# Patient Record
Sex: FEMALE | Race: WHITE | NOT HISPANIC OR LATINO | Employment: OTHER | URBAN - METROPOLITAN AREA
[De-identification: names, ages, dates, MRNs, and addresses within clinical notes are randomized per-mention and may not be internally consistent; named-entity substitution may affect disease eponyms.]

---

## 2018-02-01 ENCOUNTER — TELEPHONE (OUTPATIENT)
Dept: PULMONOLOGY | Facility: CLINIC | Age: 78
End: 2018-02-01

## 2018-02-01 ENCOUNTER — OFFICE VISIT (OUTPATIENT)
Dept: PULMONOLOGY | Facility: CLINIC | Age: 78
End: 2018-02-01
Payer: MEDICARE

## 2018-02-01 VITALS
WEIGHT: 129.19 LBS | HEART RATE: 75 BPM | RESPIRATION RATE: 16 BRPM | HEIGHT: 62 IN | BODY MASS INDEX: 23.77 KG/M2 | OXYGEN SATURATION: 97 % | DIASTOLIC BLOOD PRESSURE: 70 MMHG | SYSTOLIC BLOOD PRESSURE: 146 MMHG

## 2018-02-01 DIAGNOSIS — J47.9 BRONCHIECTASIS WITHOUT COMPLICATION: ICD-10-CM

## 2018-02-01 DIAGNOSIS — A31.0 MAIC (MYCOBACTERIUM AVIUM-INTRACELLULARE COMPLEX): Primary | ICD-10-CM

## 2018-02-01 DIAGNOSIS — J44.9 CHRONIC OBSTRUCTIVE PULMONARY DISEASE, UNSPECIFIED COPD TYPE: ICD-10-CM

## 2018-02-01 PROBLEM — M81.0 OSTEOPOROSIS, POST-MENOPAUSAL: Status: ACTIVE | Noted: 2017-10-23

## 2018-02-01 PROCEDURE — 94640 AIRWAY INHALATION TREATMENT: CPT | Mod: PBBFAC,PO

## 2018-02-01 PROCEDURE — 99999 PR PBB SHADOW E&M-NEW PATIENT-LVL IV: CPT | Mod: PBBFAC,,, | Performed by: INTERNAL MEDICINE

## 2018-02-01 PROCEDURE — 1159F MED LIST DOCD IN RCRD: CPT | Mod: ,,, | Performed by: INTERNAL MEDICINE

## 2018-02-01 PROCEDURE — 99204 OFFICE O/P NEW MOD 45 MIN: CPT | Mod: PBBFAC,PO,25 | Performed by: INTERNAL MEDICINE

## 2018-02-01 PROCEDURE — 99205 OFFICE O/P NEW HI 60 MIN: CPT | Mod: S$PBB,,, | Performed by: INTERNAL MEDICINE

## 2018-02-01 RX ORDER — ROSUVASTATIN CALCIUM 5 MG/1
5 TABLET, COATED ORAL NIGHTLY
COMMUNITY
Start: 2017-10-23

## 2018-02-01 RX ORDER — RIFAMPIN 300 MG/1
1 CAPSULE ORAL
COMMUNITY
Start: 2017-12-14 | End: 2018-02-01 | Stop reason: SDUPTHER

## 2018-02-01 RX ORDER — FLUOCINONIDE 0.5 MG/G
CREAM TOPICAL
COMMUNITY
Start: 2017-08-09 | End: 2021-08-11

## 2018-02-01 RX ORDER — LEVOFLOXACIN 500 MG/1
1 TABLET, FILM COATED ORAL DAILY
COMMUNITY
Start: 2017-12-01 | End: 2018-02-01 | Stop reason: ALTCHOICE

## 2018-02-01 RX ORDER — MONTELUKAST SODIUM 10 MG/1
10 TABLET ORAL DAILY
COMMUNITY
Start: 2017-10-23 | End: 2018-03-01 | Stop reason: SDUPTHER

## 2018-02-01 RX ORDER — RIFAMPIN 300 MG/1
300 CAPSULE ORAL
Qty: 36 CAPSULE | Refills: 4 | Status: SHIPPED | OUTPATIENT
Start: 2018-02-02 | End: 2018-04-25 | Stop reason: SINTOL

## 2018-02-01 RX ORDER — ALENDRONATE SODIUM 70 MG/1
70 TABLET ORAL DAILY
COMMUNITY
Start: 2017-10-23 | End: 2018-04-25 | Stop reason: SDDI

## 2018-02-01 RX ORDER — AMITRIPTYLINE HYDROCHLORIDE 10 MG/1
10 TABLET, FILM COATED ORAL NIGHTLY
COMMUNITY
Start: 2017-10-23

## 2018-02-01 RX ORDER — AZITHROMYCIN 500 MG/1
1 TABLET, FILM COATED ORAL
COMMUNITY
Start: 2017-12-14 | End: 2018-02-01 | Stop reason: SDUPTHER

## 2018-02-01 RX ORDER — EPINEPHRINE 0.22MG
100 AEROSOL WITH ADAPTER (ML) INHALATION DAILY
COMMUNITY

## 2018-02-01 RX ORDER — ETHAMBUTOL HYDROCHLORIDE 400 MG/1
1200 TABLET, FILM COATED ORAL
COMMUNITY
Start: 2017-12-15 | End: 2018-02-01 | Stop reason: SDUPTHER

## 2018-02-01 RX ORDER — LOSARTAN POTASSIUM 100 MG/1
100 TABLET ORAL NIGHTLY
COMMUNITY
Start: 2017-10-23 | End: 2019-04-17

## 2018-02-01 RX ORDER — AMLODIPINE BESYLATE 5 MG/1
5 TABLET ORAL EVERY MORNING
Status: ON HOLD | COMMUNITY
Start: 2017-11-27 | End: 2018-10-18 | Stop reason: HOSPADM

## 2018-02-01 RX ORDER — ALBUTEROL SULFATE 0.83 MG/ML
2.5 SOLUTION RESPIRATORY (INHALATION)
Qty: 360 ML | Refills: 12 | Status: SHIPPED | OUTPATIENT
Start: 2018-02-01 | End: 2018-05-23 | Stop reason: SINTOL

## 2018-02-01 RX ORDER — ASPIRIN 325 MG
325 TABLET, DELAYED RELEASE (ENTERIC COATED) ORAL NIGHTLY
COMMUNITY
End: 2022-03-30

## 2018-02-01 RX ORDER — ETHAMBUTOL HYDROCHLORIDE 400 MG/1
1200 TABLET, FILM COATED ORAL
Qty: 108 TABLET | Refills: 4 | Status: SHIPPED | OUTPATIENT
Start: 2018-02-02 | End: 2018-04-25 | Stop reason: SINTOL

## 2018-02-01 RX ORDER — ETODOLAC 400 MG/1
1 TABLET, FILM COATED ORAL DAILY
Status: ON HOLD | COMMUNITY
Start: 2018-01-01 | End: 2018-10-18 | Stop reason: HOSPADM

## 2018-02-01 RX ORDER — ESOMEPRAZOLE MAGNESIUM 40 MG/1
40 CAPSULE, DELAYED RELEASE ORAL
COMMUNITY
Start: 2017-10-23

## 2018-02-01 RX ORDER — AZITHROMYCIN 500 MG/1
500 TABLET, FILM COATED ORAL
Qty: 36 TABLET | Refills: 4 | Status: SHIPPED | OUTPATIENT
Start: 2018-02-02 | End: 2018-03-01

## 2018-02-01 RX ORDER — ALBUTEROL SULFATE 0.83 MG/ML
2.5 SOLUTION RESPIRATORY (INHALATION)
Status: COMPLETED | OUTPATIENT
Start: 2018-02-01 | End: 2018-02-01

## 2018-02-01 RX ADMIN — ALBUTEROL SULFATE 2.5 MG: 2.5 SOLUTION INTRABRONCHIAL at 12:02

## 2018-02-01 NOTE — PROGRESS NOTES
Subjective:       Patient ID: Daniel Jones is a 77 y.o. female.    Chief Complaint: She       Shortness of Breath (atypical bacterial infection)    HPI   Symptoms began in 2017   Aypical TB  Weight loss  Abnromal Chest X Ray  Bronchoscopy revealed Atypical TB  Abnormal CT scan    Chronic Obstructive Pulmonary Disease / Bronchiectesis  Bronchiectasis  She presents for evaluation and treatment of bronchiectasis. The patient was diagnosed with bronchiectasis on 8/1/2017 on the basis of a CT scan. The bronchiectasis was localized to the lingula and localized to the right middle lobe. The patient has a history of atypical mycobacterium infection, first diagnosed 8/2018. Prior testing has included pulmonary function testing and sputum studies for fungus and mycobacteria which revealed evidence of bronchiectesis. The patient is having constitutional symptoms, including weight loss. There evidence of MAIC history of pneumonia, and the patient has not been hospitalized for this condition before. She has a history of 5 pack years. The patient has no exercise limitations. Prior treatments have included antibiotics for MAIC, which have provided no relief of symptoms.     COPD  She presents for evaluation and treatment of COPD. The patient is not currently have symptoms / an exacerbation. The patient has COPD for approximately 1 years. Symptoms in previous episodes have included cough, fatigue and weight loss, and typically last 1 months. Previous episodes have been exacerbated by strenuous activity. Current treatment includes none, which generally provides no relief of symptoms.   She uses 1 pillows at night. Patient currently is not on home oxygen therapy.. The patient is having constitutional symptoms, including weight loss. The patient has not been hospitalized for this condition before. She has a history of 5 pack years. The patient has no exercise limitations.      Past Medical History:   Diagnosis Date    Arthritis      Hypertension     Thyroid disease      Past Surgical History:   Procedure Laterality Date    tonsilectomy N/A      Social History     Social History    Marital status:      Spouse name: N/A    Number of children: N/A    Years of education: N/A     Occupational History    Not on file.     Social History Main Topics    Smoking status: Former Smoker     Types: Cigarettes     Quit date: 1970    Smokeless tobacco: Never Used    Alcohol use No    Drug use: No    Sexual activity: No     Other Topics Concern    Not on file     Social History Narrative    No narrative on file     Review of Systems   Constitutional: Positive for fatigue. Negative for fever.   HENT: Positive for postnasal drip, rhinorrhea and congestion.    Eyes: Negative for redness and itching.   Respiratory: Positive for cough, sputum production, shortness of breath, dyspnea on extertion, use of rescue inhaler and Paroxysmal Nocturnal Dyspnea.    Cardiovascular: Negative for chest pain, palpitations and leg swelling.   Genitourinary: Negative for difficulty urinating and hematuria.   Endocrine: Negative for cold intolerance and heat intolerance.    Skin: Negative for rash.   Gastrointestinal: Negative for nausea and abdominal pain.   Neurological: Negative for dizziness, syncope, weakness and light-headedness.   Hematological: Negative for adenopathy. Does not bruise/bleed easily.   Psychiatric/Behavioral: Negative for sleep disturbance. The patient is not nervous/anxious.        Objective:      Physical Exam   Constitutional: She is oriented to person, place, and time.   Chronically ill appearing       HENT:   Head: Normocephalic and atraumatic.   Mouth/Throat: Oropharyngeal exudate present.   Eyes: Conjunctivae are normal. Pupils are equal, round, and reactive to light.   Neck: Neck supple. No JVD present. No tracheal deviation present. No thyromegaly present.   Cardiovascular: Normal rate, regular rhythm and normal heart sounds.     Pulmonary/Chest: Effort normal. No respiratory distress. She has decreased breath sounds. She has wheezes in the right lower field and the left lower field. She has no rhonchi. She has no rales. She exhibits no tenderness.   Abdominal: Soft. Bowel sounds are normal.   Musculoskeletal: Normal range of motion. She exhibits no edema.   Lymphadenopathy:     She has no cervical adenopathy.   Neurological: She is alert and oriented to person, place, and time.   Skin: Skin is warm and dry.   Nursing note and vitals reviewed.    Personal Diagnostic Review  Chest x-ray: bronchiectasis    CT Chest without Contrast[facility]9/2017  [facility]  Result Impression   Scattered reticular nodular opacities in the lungs, centered at the right  middle lobe and lingular segment left upper lobe with additional nodules in the  lingular segment as well as cylindrical bronchiectasis at both of these  locations.  The appearance and distribution is typical of atypical  mycobacterial infection.  Other chronic findings as described above.  All CT scans at this facility use dose modulation, iterative reconstruction,  and/or weight base dosing when appropriate to reduce radiation dose to as low  as reasonably achievable.   Result Narrative   EXAM:   CT  CHEST WITHOUT CONTRAST  CLINICAL HISTORY:  Other nonspecific abnormal finding of lung field.  Abnormal  chest x-ray.  COMPARISON STUDIES: Chest x-ray 07/27/2017  TECHNIQUE:  CT scan performed of the chest without IV contrast.  FINDINGS:  Heart: Heart size normal.  Small amount of scattered coronary arterial  calcifications.  Mediastinum: Small subcentimeter lymph nodes without significant enlargement.  Vasculature: Moderate aortic atherosclerosis.  Lungs: Some patchy scattered reticular nodular type opacities are present  throughout the lungs though greatest in the right middle lobe and lingular  segment in the left upper lobe.  There also are a couple large areas  of  nodularity in the lingular segment of the left upper lobe each measuring  roughly 1 cm in size.  There is some cylindrical bronchiectasis at the right  middle lobe and the very inferior lingular segment with a small amount of  scarring or atelectasis.  There are no pleural effusions.  There is a small  fat-containing Bochdalek hernia on the left.  Visualized upper abdomen: Large amount of calcified plaque aorta.  Bones: Low-grade degenerative changes in the spine.  Chest wall: Unremarkable.  Dictated and Electronically Signed By: Farhad Gannon MD on August 9, 2017     AFB culture and stain8/21/2017  [facility]  Component Name Value Ref Range   Culture AFB Mycobacterium avium complex (A)   Comment:  See Separate Reference Lab Report for additional results - [id number]  Identification and susceptibilities performed by:  [facility]  [address] [place]  [address]  This is an updated result. Previous organism was POSITIVE for AFB (isolated from liquid media) on 9/8/2017 at 1042 CDT.  This is an updated result. Previous organism was Mycobacterium abscessus group on 9/14/2017 at 0835 CDT.     AFB Stain No acid fast bacilli seen on fluorescent stain.     AFB Stain Rapid growing AFB such as M. chelonae, M. abscessus, and M. fortuitum may not fluoresce.    Comprehensive metabolic panel11/13/2017  [facility]  Component Name Value Ref Range   Glucose 85 <100 mg/dL   BUN 15 6 - 22 mg/dL   Creatinine, Ser 0.91 0.50 - 1.20 mg/dL   Total Bilirubin 0.3 0.2 - 1.1 mg/dL   Alkaline Phosphatase 78 42 - 121 IU/L    AST 14 10 - 42 IU/L    ALT 10 10 - 60 IU/L    Calcium 9.3 8.4 - 10.5 mg/dL   Sodium 137 134 - 146 meq/L   Potassium 5.0 3.6 - 5.3 meq/L   Chloride 101.0 98.0 - 111.0 meq/dL   CO2 Total 30 20 - 34 meq/L   Total Protein 6.7 6.1 - 8.5 g/dL   Albumin, Ser 4.2 3.2 - 5.5 g/dL   eGFR Non- 60     eGFR   73     Note: INTERPRETATION:   THE eGFR VALUE SHOULD NOT BE USED IN PATIENTS WITH ACUTE RENAL FAILURE.                           STAGE ONE/TWO    >60 eGFR                           STAGE THREE    30-59 eGFR                           STAGE FOUR     15-29 eGFR                             No flowsheet data found.      Assessment:       1. MAIC (mycobacterium avium-intracellulare complex)    2. Bronchiectasis without complication    3. Chronic obstructive pulmonary disease, unspecified COPD type        Outpatient Encounter Prescriptions as of 2/1/2018   Medication Sig Dispense Refill    amitriptyline (ELAVIL) 10 MG tablet Take 10 mg by mouth nightly.      amLODIPine (NORVASC) 5 MG tablet Take 1 tablet by mouth once daily.      aspirin (ECOTRIN) 325 MG EC tablet Take 325 mg by mouth nightly.      azithromycin (ZITHROMAX) 500 MG tablet Take 1 tablet (500 mg total) by mouth every Mon, Wed, Fri. 36 tablet 4    coenzyme Q10 (CO Q-10) 100 mg capsule Take 100 mg by mouth once daily.      esomeprazole (NEXIUM) 40 MG capsule Take 40 mg by mouth before breakfast.      ethambutol (MYAMBUTOL) 400 MG Tab Take 3 tablets (1,200 mg total) by mouth 3 (three) times a week. 108 tablet 4    etodolac (LODINE) 400 MG tablet Take 1 tablet by mouth once daily.      flu vac-2017 [id number],PF, (FLUAD 6124-2270, 65 YR UP,,PF,) 45 mcg (15 mcg x 3)/0.5 mL Syrg 0.5 mLs.      fluocinonide 0.05% (LIDEX) 0.05 % cream LEODAN 1 APPLICATION ON THE SKIN BID      losartan (COZAAR) 100 MG tablet Take 100 mg by mouth once daily.      montelukast (SINGULAIR) 10 mg tablet Take 10 mg by mouth once daily.      rifAMpin (RIFADIN) 300 MG capsule Take 1 capsule (300 mg total) by mouth 3 (three) times a week. 36 capsule 4    rosuvastatin (CRESTOR) 5 MG tablet Take 5 mg by mouth once daily.      [DISCONTINUED] azithromycin (ZITHROMAX) 500 MG tablet Take 1 tablet by mouth 3 (three) times a week.      [DISCONTINUED] ethambutol (MYAMBUTOL) 400 MG Tab  "Take 1,200 mg by mouth 3 (three) times a week.      [DISCONTINUED] levoFLOXacin (LEVAQUIN) 500 MG tablet Take 1 tablet by mouth once daily.      [DISCONTINUED] rifAMpin (RIFADIN) 300 MG capsule Take 1 capsule by mouth 3 (three) times a week.      albuterol (PROVENTIL) 2.5 mg /3 mL (0.083 %) nebulizer solution Take 3 mLs (2.5 mg total) by nebulization every 6 (six) hours while awake. 360 mL 12    alendronate (FOSAMAX) 70 MG tablet Take 70 mg by mouth once daily.      [] albuterol nebulizer solution 2.5 mg        No facility-administered encounter medications on file as of 2018.      Orders Placed This Encounter   Procedures    NEBULIZER FOR HOME USE     [facility] for CPAP/Oxygen/Nebulizer supplies.  Customer Service: [phone]  Call: [phone]  Fax: [phone]  Billing Inquiries: [phone] or [phone]       Order Specific Question:   Height:     Answer:   5' 2" (1.575 m)     Order Specific Question:   Weight:     Answer:   58.6 kg (129 lb 3 oz)     Order Specific Question:   Length of need (1-99 months):     Answer:   99     Order Specific Question:   Vendor:     Answer:   Other (use comments)     Order Specific Question:   Expected Date of Delivery:     Answer:   2018    NEBULIZER KIT (SUPPLIES) FOR HOME USE     Order Specific Question:   Height:     Answer:   5' 2" (1.575 m)     Order Specific Question:   Weight:     Answer:   58.6 kg (129 lb 3 oz)     Order Specific Question:   Length of need (1-99 months):     Answer:   99     Order Specific Question:   Mask or Mouthpiece?     Answer:   Mouthpiece     Order Specific Question:   Vendor:     Answer:   Other (use comments)     Order Specific Question:   Expected Date of Delivery:     Answer:   2018    X-Ray Chest PA And Lateral     Standing Status:   Future     Standing Expiration Date:   2019     Order Specific Question:   May the Radiologist modify the order per protocol to meet the clinical needs of the patient? "     Answer:   Yes    Comprehensive metabolic panel     Standing Status:   Future     Number of Occurrences:   1     Standing Expiration Date:   4/2/2019    Alpha 1 Antitrypsin Defiiciency Profile     Alpha 1 antitrypsin deficientcy DX: 273.4     Standing Status:   Future     Number of Occurrences:   1     Standing Expiration Date:   4/2/2019    CBC auto differential     Standing Status:   Future     Number of Occurrences:   1     Standing Expiration Date:   4/2/2019    Spirometry with/without bronchodilator     Standing Status:   Future     Standing Expiration Date:   2/1/2019     Plan:       Requested Prescriptions     Signed Prescriptions Disp Refills    rifAMpin (RIFADIN) 300 MG capsule 36 capsule 4     Sig: Take 1 capsule (300 mg total) by mouth 3 (three) times a week.    azithromycin (ZITHROMAX) 500 MG tablet 36 tablet 4     Sig: Take 1 tablet (500 mg total) by mouth every Mon, Wed, Fri.    ethambutol (MYAMBUTOL) 400 MG Tab 108 tablet 4     Sig: Take 3 tablets (1,200 mg total) by mouth 3 (three) times a week.    albuterol (PROVENTIL) 2.5 mg /3 mL (0.083 %) nebulizer solution 360 mL 12     Sig: Take 3 mLs (2.5 mg total) by nebulization every 6 (six) hours while awake.     MAIC (mycobacterium avium-intracellulare complex)  -     Comprehensive metabolic panel; Future; Expected date: 02/01/2018  -     Alpha 1 Antitrypsin Defiiciency Profile; Future; Expected date: 02/01/2018  -     CBC auto differential; Future; Expected date: 02/01/2018  -     Spirometry with/without bronchodilator; Future; Expected date: 08/04/2018  -     rifAMpin (RIFADIN) 300 MG capsule; Take 1 capsule (300 mg total) by mouth 3 (three) times a week.  Dispense: 36 capsule; Refill: 4  -     azithromycin (ZITHROMAX) 500 MG tablet; Take 1 tablet (500 mg total) by mouth every Mon, Wed, Fri.  Dispense: 36 tablet; Refill: 4  -     ethambutol (MYAMBUTOL) 400 MG Tab; Take 3 tablets (1,200 mg total) by mouth 3 (three) times a week.  Dispense: 108  tablet; Refill: 4  -     X-Ray Chest PA And Lateral; Future; Expected date: 02/01/2018    Bronchiectasis without complication  -     NEBULIZER FOR HOME USE  -     NEBULIZER KIT (SUPPLIES) FOR HOME USE  -     albuterol (PROVENTIL) 2.5 mg /3 mL (0.083 %) nebulizer solution; Take 3 mLs (2.5 mg total) by nebulization every 6 (six) hours while awake.  Dispense: 360 mL; Refill: 12  -     X-Ray Chest PA And Lateral; Future; Expected date: 02/01/2018    Chronic obstructive pulmonary disease, unspecified COPD type  -     NEBULIZER FOR HOME USE  -     NEBULIZER KIT (SUPPLIES) FOR HOME USE  -     albuterol (PROVENTIL) 2.5 mg /3 mL (0.083 %) nebulizer solution; Take 3 mLs (2.5 mg total) by nebulization every 6 (six) hours while awake.  Dispense: 360 mL; Refill: 12  -     albuterol nebulizer solution 2.5 mg; Take 3 mLs (2.5 mg total) by nebulization one time.  -     X-Ray Chest PA And Lateral; Future; Expected date: 02/01/2018           Follow-up in about 4 weeks (around 3/1/2018) for review of progress, CXR and Sebastian on return.    MEDICAL DECISION MAKING: Moderate to high complexity.  Overall, the multiple problems listed are of moderate to high severity that may impact quality of life and activities of daily living. Side effects of medications, treatment plan as well as options and alternatives reviewed and discussed with patient. There was counseling of patient concerning these issues.    Total time spent in face to face counseling and coordination of care - 60  minutes over 50% of time was used in discussion of prognosis, risks, benefits of treatment, instructions and compliance with regimen . Discussion with other physicians or health care providers (DME, NP, pharmacy, respiratory therapy) occurred.            REVIEW OF PRIOR NOTE FROM LINDA  FROM   Bronchiectasis  She presents for evaluation and treatment of bronchiectasis. The patient does have MAC. The patient does have a cough that is not productive. She has not lost  weight. She was started on triple drug therapy approximate 6 weeks ago but she states that she is not any better. Her biggest complaint is chest discomfort anteriorly which she stated one away when she was on Levaquin. She describes it as an ache. On a scale 1-10 she describes it as a 1 out of 10 and I did repeat this to make sure that it was this mild. She is not producing any mucus although the last 2 visits she stated she was but then she states that nothing has changed. She is not running any fever. She's having no increase shortness of breath. No weight loss. No night sweats. See review of systems.    Past Medical History:   Diagnosis Date    Environmental allergies    Family history of diabetes mellitus    Hypothyroidism, unspecified early 20's    Osteoporosis 2015   at [facility]     Past Surgical History:   Procedure Laterality Date    TONSILLECTOMY     Current Outpatient Prescriptions   Medication Sig Dispense Refill    alendronate (FOSAMAX) 70 mg tablet Take 1 tablet by mouth every 7 days. 4 tablet 11    amitriptyline (ELAVIL) 10 mg tablet Take 2 tablets by mouth nightly. 90 tablet 1    amLODIPine (NORVASC) 5 mg tablet Take 1 tablet by mouth daily. 90 tablet 1    aspirin 325 mg EC tablet Take 325 mg by mouth daily.    azithromycin (ZITHROMAX) 500 MG tablet Take 1 tablet by mouth 3 (three) times a week for 180 days. 12 tablet 5    esomeprazole (NEXIUM) 40 mg capsule Take 1 capsule by mouth every morning before breakfast. 90 capsule 1    ethambutol (MYAMBUTOL) 400 mg tablet Take 3 tablets by mouth 3 (three) times a week for 180 days. 36 tablet 5    etodolac (LODINE) 400 mg tablet Take 1 tablet by mouth daily. 90 tablet 1    FLUAD 7511-4900, 65 YR UP,,PF, 45 mcg (15 mcg x 3)/0.5 mL Syringe ADM 0.5ML IM UTD 0    fluocinonide (LIDEX) 0.05 % cream LEODAN 1 APPLICATION ON THE SKIN BID 2    levothyroxine (SYNTHROID, LEVOTHROID) 100 mcg tablet Take 1 tablet by mouth daily. 90 tablet 3    losartan  "(COZAAR) 100 MG tablet Take 1 tablet by mouth daily. 90 tablet 1    montelukast (SINGULAIR) 10 mg tablet Take 1 tablet by mouth nightly. 90 tablet 1    rifAMPin (RIFADIN) 300 mg capsule Take 2 capsules by mouth 3 (three) times a week for 180 days. 24 capsule 5    rosuvastatin (CRESTOR) 5 mg tablet Take 1 tablet by mouth daily. 90 tablet 1     No current facility-administered medications for this visit.     Allergies   Allergen Reactions    Bactrim [Sulfamethoxazole-Trimethoprim] Hives    Codeine   Can't tolerated oral form of codeine     Social History   Substance Use Topics    Smoking status: Former Smoker   Start date: 1/1/1963   Quit date: 1/1/1975    Smokeless tobacco: Never Used    Alcohol use No     Family History   Problem Relation Age of Onset    Cancer Mother   Breast    Diabetes Father    Hypertension Father    Hyperlipidemia Father    Coronary artery disease Father    Thyroid disease Sister    Cancer Brother   Prostate    Thyroid disease Brother    Thyroid disease Paternal Aunt    Coronary artery disease Paternal Aunt    Diabetes Maternal Grandmother    Thyroid disease Sister    Coronary artery disease Paternal Uncle    Diabetes Paternal Grandmother    Kidney disease Neg Hx    Obesity Neg Hx    Stroke Neg Hx     Blood pressure 118/68, pulse 94, resp. rate 18, height 157.5 cm (62.01"), weight 59.9 kg (132 lb), SpO2 95 %.    Review of Systems   Constitution: Positive for malaise/fatigue. Negative for chills, fever, weight gain and weight loss.   HENT: Negative for congestion, hoarse voice and sore throat.   Cardiovascular: Positive for chest pain (mild ant ache). Negative for dyspnea on exertion, leg swelling and palpitations.   Respiratory: Positive for cough. Negative for hemoptysis, shortness of breath, sputum production and wheezing.   Skin: Negative for nail changes.   Musculoskeletal: Positive for arthritis and joint pain.   Gastrointestinal: Negative for constipation, " diarrhea and dysphagia.   Genitourinary: Negative for dysuria and hematuria.   Neurological: Negative for dizziness.   Allergic/Immunologic: Negative for environmental allergies.   Objective   Physical Exam   Constitutional: She appears well-developed and well-nourished.   Eyes: No scleral icterus.   Neck: No JVD present. No tracheal deviation present.   Cardiovascular: Normal rate, regular rhythm and normal heart sounds.   Pulmonary/Chest: No accessory muscle usage or stridor. She has no decreased breath sounds. She has no wheezes. She has rhonchi (Faint on left). She has no rales. Chest wall is not dull to percussion. She exhibits tenderness (Right lateral costochondral junctions).   Musculoskeletal: She exhibits no edema.   Lymphadenopathy:   She has no cervical adenopathy.   Neurological: She is alert.   Skin: No cyanosis. Nails show no clubbing.   Psychiatric: She has a normal mood and affect.         Assessment   Bronchiectasis/MAC: History is very difficult today. It seems like she is better but she denies this. She is not producing any significant mucus now. Her biggest complaint is his anterior chest ache but it seems to be very mild by her rating of it. I will check lab to make sure she's not developed any adverse reactions to the medication and we will try Levaquin for 14 days and see if this helps. I will discontinue the azithromycin while she is on this. She'll continue her other medications for now.

## 2018-02-01 NOTE — TELEPHONE ENCOUNTER
----- Message from Orquidea Moffett sent at 2/1/2018  3:01 PM CST -----  Winnie ( [facility] ) is requesting a call from nurse get a prescription nebulizer.          Please call Winnie ( [facility] ) back at [phone]

## 2018-02-01 NOTE — PATIENT INSTRUCTIONS
Albuterol inhalation solution  What is this medicine?  ALBUTEROL (al BYOO ter ole) is a bronchodilator. It helps to open up the airways in your lungs to make it easier to breathe. This medicine is used to treat and to prevent bronchospasm.  How should I use this medicine?  This medicine is used in a nebulizer. Nebulizers make a liquid into an aerosol that you breathe in through your mouth or your mouth and nose into your lungs. You will be taught how to use your nebulizer. Follow the directions on your prescription label. Take your medicine at regular intervals. Do not use more often than directed.  Talk to your pediatrician regarding the use of this medicine in children. Special care may be needed.  What side effects may I notice from receiving this medicine?  Side effects that you should report to your doctor or health care professional as soon as possible:  · allergic reactions like skin rash, itching or hives, swelling of the face, lips, or tongue  · breathing problems  · chest pain  · feeling faint or lightheaded, falls  · high blood pressure  · irregular heartbeat  · fever  · muscle cramps or weakness  · pain, tingling, numbness in the hands or feet  · vomiting  Side effects that usually do not require medical attention (report to your doctor or health care professional if they continue or are bothersome):  · cough  · difficulty sleeping  · headache  · nervousness, trembling  · stomach upset  · stuffy or runny nose  · throat irritation  · unusual taste  What may interact with this medicine?  · anti-infectives like chloroquine and pentamidine  · caffeine  · cisapride  · diuretics  · medicines for colds  · medicines for depression or emotional or psychotic conditions  · medicines for weight loss including some herbal products  · methadone  · some antibiotics like clarithromycin, erythromycin, levofloxacin, and linezolid  · some heart medicines  · steroid hormones like dexamethasone, cortisone,  hydrocortisone  · theophylline  · thyroid hormones  What if I miss a dose?  If you miss a dose, use it as soon as you can. If it is almost time for your next dose, use only that dose. Do not use double or extra doses.  Where should I keep my medicine?  Keep out of the reach of children.  Store between 2 and 25 degrees C (36 and 77 degrees F). Do not freeze. Protect from light. Throw away any unused medicine after the expiration date. Most products are kept in the foil package until time of use. Some products can be used up to 1 week after they are removed from the foil pouch. Check the instructions that come with your medicine.  What should I tell my health care provider before I take this medicine?  They need to know if you have any of the following conditions:  · diabetes  · heart disease or irregular heartbeat  · high blood pressure  · pheochromocytoma  · seizures  · thyroid disease  · an unusual or allergic reaction to albuterol, levalbuterol, sulfites, other medicines, foods, dyes, or preservatives  · pregnant or trying to get pregnant  · breast-feeding  What should I watch for while using this medicine?  Tell your doctor or health care professional if your symptoms do not improve. Do not use extra albuterol. Call your doctor right away if your asthma or bronchitis gets worse while you are using this medicine.  If your mouth gets dry try chewing sugarless gum or sucking hard candy. Drink water as directed.  NOTE:This sheet is a summary. It may not cover all possible information. If you have questions about this medicine, talk to your doctor, pharmacist, or health care provider. Copyright© 2017 [employer]        Step-by-Step  Using a Nebulizer with a Mouthpiece    Date Last Reviewed: 5/29/2015  © 9855-8597 [employer]. [address] [place] [address], [place] [address]. All rights reserved. This information is not intended as a substitute for professional medical care. Always follow your healthcare  professional's instructions.        Step-by-Step  Using a Nebulizer     11 steps in using a nebulizer with a face mask     Date Last Reviewed: 3/1/2017  © 3647-6153 [employer], [employer]. [address] [place] [address], [place] [address]. All rights reserved. This information is not intended as a substitute for professional medical care. Always follow your healthcare professional's instructions.

## 2018-02-02 ENCOUNTER — LAB VISIT (OUTPATIENT)
Dept: LAB | Facility: HOSPITAL | Age: 78
End: 2018-02-02
Attending: INTERNAL MEDICINE
Payer: MEDICARE

## 2018-02-02 DIAGNOSIS — A31.0 MAIC (MYCOBACTERIUM AVIUM-INTRACELLULARE COMPLEX): ICD-10-CM

## 2018-02-02 LAB
ALBUMIN SERPL BCP-MCNC: 3.3 G/DL
ALP SERPL-CCNC: 74 U/L
ALT SERPL W/O P-5'-P-CCNC: 9 U/L
ANION GAP SERPL CALC-SCNC: 8 MMOL/L
AST SERPL-CCNC: 13 U/L
BASOPHILS # BLD AUTO: 0.08 K/UL
BASOPHILS NFR BLD: 1.5 %
BILIRUB SERPL-MCNC: 0.2 MG/DL
BUN SERPL-MCNC: 15 MG/DL
CALCIUM SERPL-MCNC: 8.7 MG/DL
CHLORIDE SERPL-SCNC: 106 MMOL/L
CO2 SERPL-SCNC: 28 MMOL/L
CREAT SERPL-MCNC: 0.9 MG/DL
DIFFERENTIAL METHOD: ABNORMAL
EOSINOPHIL # BLD AUTO: 0.3 K/UL
EOSINOPHIL NFR BLD: 4.9 %
ERYTHROCYTE [DISTWIDTH] IN BLOOD BY AUTOMATED COUNT: 12.5 %
EST. GFR  (AFRICAN AMERICAN): >60 ML/MIN/1.73 M^2
EST. GFR  (NON AFRICAN AMERICAN): >60 ML/MIN/1.73 M^2
GLUCOSE SERPL-MCNC: 75 MG/DL
HCT VFR BLD AUTO: 42.5 %
HGB BLD-MCNC: 13.5 G/DL
IMM GRANULOCYTES # BLD AUTO: 0 K/UL
IMM GRANULOCYTES NFR BLD AUTO: 0 %
LYMPHOCYTES # BLD AUTO: 0.9 K/UL
LYMPHOCYTES NFR BLD: 16.3 %
MCH RBC QN AUTO: 29.5 PG
MCHC RBC AUTO-ENTMCNC: 31.8 G/DL
MCV RBC AUTO: 93 FL
MONOCYTES # BLD AUTO: 0.4 K/UL
MONOCYTES NFR BLD: 7.7 %
NEUTROPHILS # BLD AUTO: 3.7 K/UL
NEUTROPHILS NFR BLD: 69.6 %
NRBC BLD-RTO: 0 /100 WBC
PLATELET # BLD AUTO: 295 K/UL
PMV BLD AUTO: 10.1 FL
POTASSIUM SERPL-SCNC: 4.3 MMOL/L
PROT SERPL-MCNC: 6.6 G/DL
RBC # BLD AUTO: 4.57 M/UL
SODIUM SERPL-SCNC: 142 MMOL/L
WBC # BLD AUTO: 5.34 K/UL

## 2018-02-02 PROCEDURE — 85025 COMPLETE CBC W/AUTO DIFF WBC: CPT

## 2018-02-02 PROCEDURE — 82103 ALPHA-1-ANTITRYPSIN TOTAL: CPT

## 2018-02-02 PROCEDURE — 80053 COMPREHEN METABOLIC PANEL: CPT

## 2018-02-02 PROCEDURE — 36415 COLL VENOUS BLD VENIPUNCTURE: CPT | Mod: PO

## 2018-02-05 RX ORDER — PEAK FLOW METER
EACH MISCELLANEOUS
COMMUNITY
Start: 2018-02-01 | End: 2018-02-06

## 2018-02-05 RX ORDER — PEAK FLOW METER
EACH MISCELLANEOUS
Status: CANCELLED | OUTPATIENT
Start: 2018-02-05

## 2018-02-05 NOTE — TELEPHONE ENCOUNTER
----- Message from Cherrie David sent at 2/5/2018  3:14 PM CST -----  Contact: Patient  Patient called to speak with the nurse. She doesn't have a prescription for a Nebulizer machine and [facility] has been calling about it.    [facility] - [facility] [address] AT [facility] [employer] [facility]  [address] [address]  Phone: [phone] Fax: [phone]    She can be contacted at [phone].    Thanks,  Cherrie

## 2018-02-06 NOTE — TELEPHONE ENCOUNTER
----- Message from Falguni Carrillo sent at 2/6/2018  2:42 PM CST -----  Contact: Pt  Please give pt a call at ..[phone] (home) regarding her script for her nebulizer pt has been contacting the pharmacy and it's still no there.

## 2018-02-09 LAB
A1AT PROTEOTYPE S/Z, LC-MS/MS: NORMAL
A1AT SERPL NEPH-MCNC: 151 MG/DL

## 2018-02-11 PROBLEM — J44.9 CHRONIC OBSTRUCTIVE PULMONARY DISEASE: Status: ACTIVE | Noted: 2018-02-11

## 2018-03-01 ENCOUNTER — HOSPITAL ENCOUNTER (OUTPATIENT)
Dept: RADIOLOGY | Facility: HOSPITAL | Age: 78
Discharge: HOME | End: 2018-03-01
Attending: INTERNAL MEDICINE
Payer: MEDICARE

## 2018-03-01 ENCOUNTER — PROCEDURE VISIT (OUTPATIENT)
Dept: PULMONOLOGY | Facility: CLINIC | Age: 78
End: 2018-03-01
Payer: MEDICARE

## 2018-03-01 ENCOUNTER — OFFICE VISIT (OUTPATIENT)
Dept: PULMONOLOGY | Facility: CLINIC | Age: 78
End: 2018-03-01
Payer: MEDICARE

## 2018-03-01 VITALS
OXYGEN SATURATION: 98 % | RESPIRATION RATE: 16 BRPM | SYSTOLIC BLOOD PRESSURE: 132 MMHG | WEIGHT: 128.06 LBS | HEART RATE: 78 BPM | BODY MASS INDEX: 23.57 KG/M2 | DIASTOLIC BLOOD PRESSURE: 56 MMHG | HEIGHT: 62 IN

## 2018-03-01 DIAGNOSIS — Z51.81 MEDICATION MONITORING ENCOUNTER: ICD-10-CM

## 2018-03-01 DIAGNOSIS — J47.9 BRONCHIECTASIS WITHOUT COMPLICATION: ICD-10-CM

## 2018-03-01 DIAGNOSIS — A31.0 MAIC (MYCOBACTERIUM AVIUM-INTRACELLULARE COMPLEX): ICD-10-CM

## 2018-03-01 DIAGNOSIS — J44.9 CHRONIC OBSTRUCTIVE PULMONARY DISEASE, UNSPECIFIED COPD TYPE: ICD-10-CM

## 2018-03-01 DIAGNOSIS — A31.0 MAIC (MYCOBACTERIUM AVIUM-INTRACELLULARE COMPLEX): Primary | ICD-10-CM

## 2018-03-01 LAB
PRE FEV1 FVC: 59.85 % (ref 65.08–84.67)
PRE FEV1: 1.57 L (ref 1.37–2.47)
PRE FVC: 2.62 L (ref 1.92–3.22)
PRE PEF: 3.44 L/S (ref 3.34–6.55)

## 2018-03-01 PROCEDURE — 94060 EVALUATION OF WHEEZING: CPT | Mod: PBBFAC,PO

## 2018-03-01 PROCEDURE — 94060 EVALUATION OF WHEEZING: CPT | Mod: 26,S$PBB,, | Performed by: INTERNAL MEDICINE

## 2018-03-01 PROCEDURE — 99215 OFFICE O/P EST HI 40 MIN: CPT | Mod: 25,S$PBB,, | Performed by: INTERNAL MEDICINE

## 2018-03-01 PROCEDURE — 71046 X-RAY EXAM CHEST 2 VIEWS: CPT | Mod: TC,FY,PO

## 2018-03-01 PROCEDURE — 71046 X-RAY EXAM CHEST 2 VIEWS: CPT | Mod: 26,,, | Performed by: RADIOLOGY

## 2018-03-01 PROCEDURE — 99214 OFFICE O/P EST MOD 30 MIN: CPT | Mod: PBBFAC,PO | Performed by: INTERNAL MEDICINE

## 2018-03-01 PROCEDURE — 99999 PR PBB SHADOW E&M-EST. PATIENT-LVL IV: CPT | Mod: PBBFAC,,, | Performed by: INTERNAL MEDICINE

## 2018-03-01 RX ORDER — AZITHROMYCIN 250 MG/1
500 TABLET, FILM COATED ORAL
Qty: 12 TABLET | Refills: 0 | Status: SHIPPED | OUTPATIENT
Start: 2018-03-02 | End: 2018-03-01 | Stop reason: SDUPTHER

## 2018-03-01 RX ORDER — PEAK FLOW METER
EACH MISCELLANEOUS
Refills: 0 | COMMUNITY
Start: 2018-02-07

## 2018-03-01 RX ORDER — MONTELUKAST SODIUM 10 MG/1
10 TABLET ORAL DAILY
Qty: 90 TABLET | Refills: 4 | Status: SHIPPED | OUTPATIENT
Start: 2018-03-01 | End: 2018-05-23 | Stop reason: SDUPTHER

## 2018-03-01 RX ORDER — AZITHROMYCIN 250 MG/1
500 TABLET, FILM COATED ORAL
Qty: 36 TABLET | Refills: 4 | Status: SHIPPED | OUTPATIENT
Start: 2018-03-02 | End: 2018-04-25 | Stop reason: SINTOL

## 2018-03-01 RX ORDER — LEVOTHYROXINE SODIUM 88 UG/1
88 TABLET ORAL NIGHTLY
COMMUNITY

## 2018-03-01 NOTE — PATIENT INSTRUCTIONS
[employer]ron NE-U22V Micro Air Vibrating Mesh Nebulizer MicroAir Kit        Azithromycin tablets  What is this medicine?  AZITHROMYCIN (az ith reggie MYE sin) is a macrolide antibiotic. It is used to treat or prevent certain kinds of bacterial infections. It will not work for colds, flu, or other viral infections.  How should I use this medicine?  Take this medicine by mouth with a full glass of water. Follow the directions on the prescription label. The tablets can be taken with food or on an empty stomach. If the medicine upsets your stomach, take it with food. Take your medicine at regular intervals. Do not take your medicine more often than directed. Take all of your medicine as directed even if you think your are better. Do not skip doses or stop your medicine early. Talk to your pediatrician regarding the use of this medicine in children. Special care may be needed.  What side effects may I notice from receiving this medicine?  Side effects that you should report to your doctor or health care professional as soon as possible:  · allergic reactions like skin rash, itching or hives, swelling of the face, lips, or tongue  · confusion, nightmares or hallucinations  · dark urine  · difficulty breathing  · hearing loss  · irregular heartbeat or chest pain  · pain or difficulty passing urine  · redness, blistering, peeling or loosening of the skin, including inside the mouth  · white patches or sores in the mouth  · yellowing of the eyes or skin  Side effects that usually do not require medical attention (report to your doctor or health care professional if they continue or are bothersome):  · diarrhea  · dizziness, drowsiness  · headache  · stomach upset or vomiting  · tooth discoloration  · vaginal irritation  What may interact with this medicine?  Do not take this medicine with any of the following medications:  · lincomycin  This medicine may also interact with the following medications:  · amiodarone  · antacids  · birth  control pills  · cyclosporine  · digoxin  · magnesium  · nelfinavir  · phenytoin  · warfarin  What if I miss a dose?  If you miss a dose, take it as soon as you can. If it is almost time for your next dose, take only that dose. Do not take double or extra doses.  Where should I keep my medicine?  Keep out of the reach of children.  Store at room temperature between 15 and 30 degrees C (59 and 86 degrees F). Throw away any unused medicine after the expiration date.  What should I tell my health care provider before I take this medicine?  They need to know if you have any of these conditions:  · kidney disease  · liver disease  · irregular heartbeat or heart disease  · an unusual or allergic reaction to azithromycin, erythromycin, other macrolide antibiotics, foods, dyes, or preservatives  · pregnant or trying to get pregnant  · breast-feeding  What should I watch for while using this medicine?  Tell your doctor or health care professional if your symptoms do not improve.  Do not treat diarrhea with over the counter products. Contact your doctor if you have diarrhea that lasts more than 2 days or if it is severe and watery.  This medicine can make you more sensitive to the sun. Keep out of the sun. If you cannot avoid being in the sun, wear protective clothing and use sunscreen. Do not use sun lamps or tanning beds/booths.  NOTE:This sheet is a summary. It may not cover all possible information. If you have questions about this medicine, talk to your doctor, pharmacist, or health care provider. Copyright© 2017 [employer]

## 2018-04-10 ENCOUNTER — TELEPHONE (OUTPATIENT)
Dept: PULMONOLOGY | Facility: CLINIC | Age: 78
End: 2018-04-10

## 2018-04-10 NOTE — TELEPHONE ENCOUNTER
Called to schedule Pulmonary Disease Management initial appointment. Unable to talk at this time. Will call back at a later date.

## 2018-04-20 ENCOUNTER — TELEPHONE (OUTPATIENT)
Dept: PULMONOLOGY | Facility: CLINIC | Age: 78
End: 2018-04-20

## 2018-04-20 NOTE — TELEPHONE ENCOUNTER
Pt contacted to addresss any questions or concerns they may have.    Mobile called. Message left stating intentions.    Home called. Line busy.

## 2018-04-20 NOTE — TELEPHONE ENCOUNTER
----- Message from Annie Maldonado sent at 4/20/2018  9:21 AM CDT -----  Contact: pt  She's calling in regards to treatment pls call pt back at [phone] or [phone] (home)

## 2018-04-24 ENCOUNTER — TELEPHONE (OUTPATIENT)
Dept: PULMONOLOGY | Facility: CLINIC | Age: 78
End: 2018-04-24

## 2018-04-24 NOTE — TELEPHONE ENCOUNTER
Spoke with Mrs. Jones regarding Pulmonary Disease Management program.  Patient declined program at this time.

## 2018-04-25 ENCOUNTER — OFFICE VISIT (OUTPATIENT)
Dept: PULMONOLOGY | Facility: CLINIC | Age: 78
End: 2018-04-25
Payer: MEDICARE

## 2018-04-25 ENCOUNTER — LAB VISIT (OUTPATIENT)
Dept: LAB | Facility: HOSPITAL | Age: 78
End: 2018-04-25
Attending: INTERNAL MEDICINE
Payer: MEDICARE

## 2018-04-25 VITALS
OXYGEN SATURATION: 99 % | HEART RATE: 74 BPM | BODY MASS INDEX: 23.17 KG/M2 | HEIGHT: 62 IN | SYSTOLIC BLOOD PRESSURE: 140 MMHG | WEIGHT: 125.88 LBS | RESPIRATION RATE: 18 BRPM | DIASTOLIC BLOOD PRESSURE: 85 MMHG

## 2018-04-25 DIAGNOSIS — R07.9 ACUTE CHEST PAIN: ICD-10-CM

## 2018-04-25 DIAGNOSIS — Z51.81 MEDICATION MONITORING ENCOUNTER: ICD-10-CM

## 2018-04-25 DIAGNOSIS — I26.99 OTHER ACUTE PULMONARY EMBOLISM WITHOUT ACUTE COR PULMONALE: Primary | ICD-10-CM

## 2018-04-25 DIAGNOSIS — I26.99 OTHER ACUTE PULMONARY EMBOLISM WITHOUT ACUTE COR PULMONALE: ICD-10-CM

## 2018-04-25 DIAGNOSIS — R10.13 DYSPEPSIA: ICD-10-CM

## 2018-04-25 DIAGNOSIS — A31.0 MAIC (MYCOBACTERIUM AVIUM-INTRACELLULARE COMPLEX): Primary | ICD-10-CM

## 2018-04-25 LAB
ALBUMIN SERPL BCP-MCNC: 3.6 G/DL
ALP SERPL-CCNC: 67 U/L
ALT SERPL W/O P-5'-P-CCNC: 13 U/L
ANION GAP SERPL CALC-SCNC: 9 MMOL/L
AST SERPL-CCNC: 14 U/L
BASOPHILS # BLD AUTO: 0.04 K/UL
BASOPHILS NFR BLD: 0.7 %
BILIRUB SERPL-MCNC: 0.2 MG/DL
BUN SERPL-MCNC: 15 MG/DL
CALCIUM SERPL-MCNC: 9.3 MG/DL
CHLORIDE SERPL-SCNC: 101 MMOL/L
CO2 SERPL-SCNC: 27 MMOL/L
CREAT SERPL-MCNC: 0.9 MG/DL
CREAT SERPL-MCNC: 0.9 MG/DL
D DIMER PPP IA.FEU-MCNC: 0.54 MG/L FEU
DIFFERENTIAL METHOD: ABNORMAL
EOSINOPHIL # BLD AUTO: 0.3 K/UL
EOSINOPHIL NFR BLD: 5.1 %
ERYTHROCYTE [DISTWIDTH] IN BLOOD BY AUTOMATED COUNT: 13.3 %
EST. GFR  (AFRICAN AMERICAN): >60 ML/MIN/1.73 M^2
EST. GFR  (AFRICAN AMERICAN): >60 ML/MIN/1.73 M^2
EST. GFR  (NON AFRICAN AMERICAN): >60 ML/MIN/1.73 M^2
EST. GFR  (NON AFRICAN AMERICAN): >60 ML/MIN/1.73 M^2
GLUCOSE SERPL-MCNC: 90 MG/DL
HCT VFR BLD AUTO: 45.9 %
HGB BLD-MCNC: 14.8 G/DL
LYMPHOCYTES # BLD AUTO: 0.7 K/UL
LYMPHOCYTES NFR BLD: 12.7 %
MCH RBC QN AUTO: 30.3 PG
MCHC RBC AUTO-ENTMCNC: 32.2 G/DL
MCV RBC AUTO: 94 FL
MONOCYTES # BLD AUTO: 0.3 K/UL
MONOCYTES NFR BLD: 5.1 %
NEUTROPHILS # BLD AUTO: 4.4 K/UL
NEUTROPHILS NFR BLD: 76.4 %
PLATELET # BLD AUTO: 323 K/UL
PMV BLD AUTO: 9.6 FL
POTASSIUM SERPL-SCNC: 4.4 MMOL/L
PROT SERPL-MCNC: 6.9 G/DL
RBC # BLD AUTO: 4.89 M/UL
SODIUM SERPL-SCNC: 137 MMOL/L
WBC # BLD AUTO: 5.69 K/UL

## 2018-04-25 PROCEDURE — 99215 OFFICE O/P EST HI 40 MIN: CPT | Mod: PBBFAC,PO | Performed by: INTERNAL MEDICINE

## 2018-04-25 PROCEDURE — 99999 PR PBB SHADOW E&M-EST. PATIENT-LVL V: CPT | Mod: PBBFAC,,, | Performed by: INTERNAL MEDICINE

## 2018-04-25 PROCEDURE — 80053 COMPREHEN METABOLIC PANEL: CPT | Mod: PO

## 2018-04-25 PROCEDURE — 99215 OFFICE O/P EST HI 40 MIN: CPT | Mod: S$PBB,,, | Performed by: INTERNAL MEDICINE

## 2018-04-25 PROCEDURE — 85025 COMPLETE CBC W/AUTO DIFF WBC: CPT | Mod: PO

## 2018-04-25 PROCEDURE — 36415 COLL VENOUS BLD VENIPUNCTURE: CPT | Mod: PO

## 2018-04-25 PROCEDURE — 85379 FIBRIN DEGRADATION QUANT: CPT

## 2018-04-25 RX ORDER — MAG HYDROX/ALUMINUM HYD/SIMETH 200-200-20
30 SUSPENSION, ORAL (FINAL DOSE FORM) ORAL
Qty: 354 ML | Status: ON HOLD | COMMUNITY
Start: 2018-04-25 | End: 2018-10-18 | Stop reason: HOSPADM

## 2018-04-25 NOTE — PROGRESS NOTES
Subjective:       Patient ID: Daniel Jones is a 77 y.o. female.    Chief Complaint: She       Mycobacteruim avium-intracellulare complex and COPD    HPI     Confused easily  Not feeling well  Feels light headed every day  Patient believes she is having side effects from medications for Mycobacterium Avium Intracellularie. C/o light headed and confused easily. Has to stop and think about certainthings  Confused   For some time  Hx of travel to [place] for 3 weeks   Felt better in [place]  Feels like she had a heart attack    Chest Pain  Daniel Jones complains of chest pain. Onset was 1 week ago. Symptoms have worsened since that time. The patient's pain is intermittent. The patient describes the pain as pressure and does not radiate. Patient rates pain as a 5/10 in intensity. Associated symptoms are: none. Aggravating factors are: none. Alleviating factors are: none. Patient's cardiac risk factors are: advanced age (older than 55 for men, 65 for women). Patient's risk factors for DVT/PE: long duration of automobile or plane travel. Previous cardiac testing: heart cath years ago none.  history of indigeston    Chronic Obstructive Pulmonary Disease / Bronchiectesis  Bronchiectasis  She presents for evaluation and treatment of bronchiectasis. The patient was diagnosed with bronchiectasis on 8/1/2017 on the basis of a CT scan. The bronchiectasis was localized to the lingula and localized to the right middle lobe. The patient has a history of atypical mycobacterium infection, first diagnosed 8/2018. Prior testing has included pulmonary function testing and sputum studies for fungus and mycobacteria which revealed evidence of bronchiectesis. The patient is having constitutional symptoms, including weight loss. There evidence of MAIC history of pneumonia, and the patient has not been hospitalized for this condition before. She has a history of 5 pack years. The patient has no exercise limitations. Prior treatments have  included antibiotics for MAIC, which have provided no relief of symptoms.      COPD  She presents for evaluation and treatment of COPD. The patient is not currently have symptoms / an exacerbation. The patient has COPD for approximately 1 years. Symptoms in previous episodes have included cough, fatigue and weight loss, and typically last 1 months. Previous episodes have been exacerbated by strenuous activity. Current treatment includes none, which generally provides no relief of symptoms.   She uses 1 pillows at night. Patient currently is not on home oxygen therapy.. The patient is having constitutional symptoms, including weight loss. The patient has not been hospitalized for this condition before. She has a history of 5 pack years. The patient has no exercise limitations.     Past Medical History:   Diagnosis Date    Arthritis     Hypertension     Thyroid disease      Past Surgical History:   Procedure Laterality Date    tonsilectomy N/A      Social History     Social History    Marital status:      Spouse name: N/A    Number of children: N/A    Years of education: N/A     Occupational History    Not on file.     Social History Main Topics    Smoking status: Former Smoker     Types: Cigarettes     Quit date: 1970    Smokeless tobacco: Never Used    Alcohol use No    Drug use: No    Sexual activity: No     Other Topics Concern    Not on file     Social History Narrative    No narrative on file     Review of Systems   Constitutional: Positive for fatigue. Negative for fever.   HENT: Positive for postnasal drip, rhinorrhea and congestion.    Eyes: Negative for redness and itching.   Respiratory: Positive for cough, sputum production, shortness of breath, dyspnea on extertion, use of rescue inhaler and Paroxysmal Nocturnal Dyspnea.    Cardiovascular: Negative for chest pain, palpitations and leg swelling.   Genitourinary: Negative for difficulty urinating and hematuria.   Endocrine: Negative  for cold intolerance and heat intolerance.    Skin: Negative for rash.   Gastrointestinal: Negative for nausea and abdominal pain.   Neurological: Negative for dizziness, syncope, weakness and light-headedness.   Hematological: Negative for adenopathy. Does not bruise/bleed easily.   Psychiatric/Behavioral: Negative for sleep disturbance. The patient is not nervous/anxious.        Objective:      Physical Exam   Constitutional: She is oriented to person, place, and time. She appears well-developed and well-nourished.   HENT:   Head: Normocephalic and atraumatic.   Mouth/Throat: Oropharyngeal exudate present.   Eyes: Conjunctivae are normal. Pupils are equal, round, and reactive to light.   Neck: Neck supple. No JVD present. No tracheal deviation present. No thyromegaly present.   Cardiovascular: Normal rate, regular rhythm and normal heart sounds.    Pulmonary/Chest: Effort normal. No respiratory distress. She has decreased breath sounds. She has wheezes in the right lower field and the left lower field. She has no rhonchi. She has no rales. She exhibits no tenderness.   Abdominal: Soft. Bowel sounds are normal.   Musculoskeletal: Normal range of motion. She exhibits no edema.   Lymphadenopathy:     She has no cervical adenopathy.   Neurological: She is alert and oriented to person, place, and time.   Skin: Skin is warm and dry.   Nursing note and vitals reviewed.    Personal Diagnostic Review  Chest x-ray: hyperinflation and upper lobe scarrring  Office Spirometry Results:     No flowsheet data found.  Pulmonary Studies Review 4/25/2018   SpO2 99   Height 62.000   Weight 2014.12   BMI (Calculated) 23.1   Predicted Distance 296.61   Predicted Distance Meters (Calculated) 435.07         Assessment:       1. MAIC (mycobacterium avium-intracellulare complex)    2. Medication monitoring encounter    3. Dyspepsia    4. Acute chest pain    5. Other acute pulmonary embolism without acute cor pulmonale        Outpatient  Encounter Prescriptions as of 4/25/2018   Medication Sig Dispense Refill    albuterol (PROVENTIL) 2.5 mg /3 mL (0.083 %) nebulizer solution Take 3 mLs (2.5 mg total) by nebulization every 6 (six) hours while awake. 360 mL 12    aluminum-magnesium hydroxide-simethicone (MAALOX) 200-200-20 mg/5 mL Susp Take 30 mLs by mouth 4 (four) times daily before meals and nightly. 354 mL prn    amitriptyline (ELAVIL) 10 MG tablet Take 10 mg by mouth nightly.      amLODIPine (NORVASC) 5 MG tablet Take 1 tablet by mouth once daily.      aspirin (ECOTRIN) 325 MG EC tablet Take 325 mg by mouth nightly.      coenzyme Q10 (CO Q-10) 100 mg capsule Take 100 mg by mouth once daily.      esomeprazole (NEXIUM) 40 MG capsule Take 40 mg by mouth before breakfast.      etodolac (LODINE) 400 MG tablet Take 1 tablet by mouth once daily.      flu vac-2017 [id number],PF, (FLUAD 7032-8191, 65 YR UP,,PF,) 45 mcg (15 mcg x 3)/0.5 mL Syrg 0.5 mLs.      fluocinonide 0.05% (LIDEX) 0.05 % cream LEODAN 1 APPLICATION ON THE SKIN BID      levothyroxine (SYNTHROID) 88 MCG tablet Take 88 mcg by mouth once daily.      losartan (COZAAR) 100 MG tablet Take 100 mg by mouth once daily.      montelukast (SINGULAIR) 10 mg tablet Take 1 tablet (10 mg total) by mouth once daily. 90 tablet 4    rosuvastatin (CRESTOR) 5 MG tablet Take 5 mg by mouth once daily.      VIOS AEROSOL DELIVERY SYSTEM Abby USE 1 VIAL NEBULIZED Q 6 H WHILE AWAKE  0    [DISCONTINUED] alendronate (FOSAMAX) 70 MG tablet Take 70 mg by mouth once daily.      [DISCONTINUED] azithromycin (ZITHROMAX Z-CATIA) 250 MG tablet Take 2 tablets (500 mg total) by mouth every Mon, Wed, Fri. 36 tablet 4    [DISCONTINUED] ethambutol (MYAMBUTOL) 400 MG Tab Take 3 tablets (1,200 mg total) by mouth 3 (three) times a week. 108 tablet 4    [DISCONTINUED] rifAMpin (RIFADIN) 300 MG capsule Take 1 capsule (300 mg total) by mouth 3 (three) times a week. 36 capsule 4     No facility-administered encounter  medications on file as of 4/25/2018.      Orders Placed This Encounter   Procedures    CTA Chest Non-Coronary     Standing Status:   Future     Number of Occurrences:   1     Standing Expiration Date:   4/25/2019     Scheduling Instructions:      CT angiography of chest to examine pulmonary arteries. Pulmonary embolus protocol.     Order Specific Question:   Is the patient allergic to iodine or contrast? Has a steroid / antihistamine prep been administered?     Answer:   No     Order Specific Question:   Is the patient on ANY Metformin drug such as Glugophage/Glucovance?           Should be off drug 48 hours after contrast. Check renal function before restart.     Answer:   No     Order Specific Question:   Does the patient have any of the following risk factors?     Answer:   Unable to assess     Order Specific Question:   Age > 60 years?     Answer:   Yes     Order Specific Question:   History of Kidney Disease - including: decreased kidney function, dialysis, kidney transplay, single kidney, kidney cancer, kidney surgery?     Answer:   None     Order Specific Question:   Does the patient have high blood pressure requiring medical treatment?     Answer:   Yes     Order Specific Question:   Diabetes?     Answer:   No    CBC auto differential     Standing Status:   Future     Number of Occurrences:   1     Standing Expiration Date:   6/24/2019    Comprehensive metabolic panel     Standing Status:   Future     Number of Occurrences:   1     Standing Expiration Date:   6/24/2019    D-DIMER, QUANTITATIVE     Standing Status:   Future     Number of Occurrences:   1     Standing Expiration Date:   6/24/2019    Ambulatory consult to Pharmacy     Referral Priority:   Routine     Referral Type:   Consultation     Referral Reason:   Specialty Services Required     Number of Visits Requested:   1     Plan:       Requested Prescriptions     Signed Prescriptions Disp Refills    aluminum-magnesium hydroxide-simethicone  (MAALOX) 200-200-20 mg/5 mL Susp 354 mL prn     Sig: Take 30 mLs by mouth 4 (four) times daily before meals and nightly.     MAIC (mycobacterium avium-intracellulare complex)  -     Ambulatory consult to Pharmacy    Medication monitoring encounter  -     Ambulatory consult to Pharmacy  -     CBC auto differential; Future; Expected date: 04/25/2018  -     Comprehensive metabolic panel; Future; Expected date: 04/25/2018    Dyspepsia  -     aluminum-magnesium hydroxide-simethicone (MAALOX) 200-200-20 mg/5 mL Susp; Take 30 mLs by mouth 4 (four) times daily before meals and nightly.  Dispense: 354 mL; Refill: prn    Acute chest pain  -     CTA Chest Non-Coronary; Future; Expected date: 04/25/2018    Other acute pulmonary embolism without acute cor pulmonale  -     CTA Chest Non-Coronary; Future; Expected date: 04/25/2018  -     D-DIMER, QUANTITATIVE; Future; Expected date: 04/25/2018    Stop antibiotics due to side effects. Will restart one at a time       Follow-up in about 4 weeks (around 5/23/2018).    MEDICAL DECISION MAKING: Moderate to high complexity.  Overall, the multiple problems listed are of moderate to high severity that may impact quality of life and activities of daily living. Side effects of medications, treatment plan as well as options and alternatives reviewed and discussed with patient. There was counseling of patient concerning these issues.    Total time spent in face to face counseling and coordination of care - 45  minutes over 50% of time was used in discussion of prognosis, risks, benefits of treatment, instructions and compliance with regimen . Discussion with other physicians or health care providers (DME, NP, pharmacy, respiratory therapy) occurred.

## 2018-04-26 ENCOUNTER — TELEPHONE (OUTPATIENT)
Dept: PULMONOLOGY | Facility: CLINIC | Age: 78
End: 2018-04-26

## 2018-04-26 ENCOUNTER — HOSPITAL ENCOUNTER (OUTPATIENT)
Dept: RADIOLOGY | Facility: HOSPITAL | Age: 78
Discharge: HOME | End: 2018-04-26
Attending: INTERNAL MEDICINE
Payer: MEDICARE

## 2018-04-26 DIAGNOSIS — R07.9 ACUTE CHEST PAIN: ICD-10-CM

## 2018-04-26 DIAGNOSIS — I26.99 OTHER ACUTE PULMONARY EMBOLISM WITHOUT ACUTE COR PULMONALE: ICD-10-CM

## 2018-04-26 PROCEDURE — 25500020 PHARM REV CODE 255: Mod: PO | Performed by: INTERNAL MEDICINE

## 2018-04-26 PROCEDURE — 71275 CT ANGIOGRAPHY CHEST: CPT | Mod: 26,,, | Performed by: RADIOLOGY

## 2018-04-26 PROCEDURE — 71275 CT ANGIOGRAPHY CHEST: CPT | Mod: TC,PO

## 2018-04-26 RX ADMIN — IOHEXOL 100 ML: 350 INJECTION, SOLUTION INTRAVENOUS at 02:04

## 2018-04-27 ENCOUNTER — TELEPHONE (OUTPATIENT)
Dept: PULMONOLOGY | Facility: CLINIC | Age: 78
End: 2018-04-27

## 2018-04-27 DIAGNOSIS — A31.0 MAIC (MYCOBACTERIUM AVIUM-INTRACELLULARE COMPLEX): Primary | ICD-10-CM

## 2018-04-27 DIAGNOSIS — J44.9 CHRONIC OBSTRUCTIVE PULMONARY DISEASE, UNSPECIFIED COPD TYPE: ICD-10-CM

## 2018-04-27 NOTE — TELEPHONE ENCOUNTER
----- Message from Brianna Gannon sent at 4/27/2018 10:27 AM CDT -----  Contact: Pt  She is calling in regards to she would like to talk to the nurse pertaining to her medication and can be reached at .[phone] (bdht)

## 2018-04-27 NOTE — TELEPHONE ENCOUNTER
Pt contacted staff concerned that medications were d/c on 4/25/2018 . Pt informed that medications were d/c d/t side effects. Pt would like to know if there is anything else that can replace them, or if she can take lower doses. Pt informed that Dr Yadav would be made aware of the situation. Pt verbalized understanding.

## 2018-05-01 RX ORDER — IPRATROPIUM BROMIDE 0.5 MG/2.5ML
500 SOLUTION RESPIRATORY (INHALATION) 4 TIMES DAILY
Qty: 120 VIAL | Refills: 11 | Status: SHIPPED | OUTPATIENT
Start: 2018-05-01 | End: 2018-05-23 | Stop reason: SDUPTHER

## 2018-05-01 RX ORDER — AZITHROMYCIN 250 MG/1
250 TABLET, FILM COATED ORAL
Qty: 12 TABLET | Refills: 11 | Status: ON HOLD | OUTPATIENT
Start: 2018-05-02 | End: 2018-10-18 | Stop reason: HOSPADM

## 2018-05-01 NOTE — TELEPHONE ENCOUNTER
Returned call  C/o of heart palpitations  Stopped albuterol  Replace with atrovent  Patient complained of other side effects thought to be due to antibiotics    Stopped zithro/rifampin and ethambutol  Instructed to restart one at a time  Will start zithromycin mycin 250 tid Mon/Wed/Fri

## 2018-05-23 ENCOUNTER — OFFICE VISIT (OUTPATIENT)
Dept: PULMONOLOGY | Facility: CLINIC | Age: 78
End: 2018-05-23
Payer: MEDICARE

## 2018-05-23 VITALS
HEART RATE: 103 BPM | SYSTOLIC BLOOD PRESSURE: 112 MMHG | OXYGEN SATURATION: 96 % | RESPIRATION RATE: 17 BRPM | WEIGHT: 125.25 LBS | HEIGHT: 62 IN | DIASTOLIC BLOOD PRESSURE: 74 MMHG | BODY MASS INDEX: 23.05 KG/M2

## 2018-05-23 DIAGNOSIS — J44.9 CHRONIC OBSTRUCTIVE PULMONARY DISEASE, UNSPECIFIED COPD TYPE: Primary | ICD-10-CM

## 2018-05-23 DIAGNOSIS — J47.9 BRONCHIECTASIS WITHOUT COMPLICATION: ICD-10-CM

## 2018-05-23 PROCEDURE — 99999 PR PBB SHADOW E&M-EST. PATIENT-LVL IV: CPT | Mod: PBBFAC,,, | Performed by: INTERNAL MEDICINE

## 2018-05-23 PROCEDURE — 99215 OFFICE O/P EST HI 40 MIN: CPT | Mod: S$PBB,,, | Performed by: INTERNAL MEDICINE

## 2018-05-23 PROCEDURE — 99214 OFFICE O/P EST MOD 30 MIN: CPT | Mod: PBBFAC,PO | Performed by: INTERNAL MEDICINE

## 2018-05-23 RX ORDER — IPRATROPIUM BROMIDE 0.5 MG/2.5ML
500 SOLUTION RESPIRATORY (INHALATION) 4 TIMES DAILY
Qty: 120 VIAL | Refills: 11 | Status: SHIPPED | OUTPATIENT
Start: 2018-05-23 | End: 2018-05-31 | Stop reason: SDUPTHER

## 2018-05-23 RX ORDER — MONTELUKAST SODIUM 10 MG/1
10 TABLET ORAL DAILY
Qty: 90 TABLET | Refills: 4 | Status: SHIPPED | OUTPATIENT
Start: 2018-05-23

## 2018-05-23 NOTE — PATIENT INSTRUCTIONS
Ipratropium solution for inhalation  What is this medicine?  IPRATROPIUM (ruy caballero daria) is a bronchodilator. It helps open up the airways in your lungs to make it easier to breathe. This medicine is used to treat chronic obstructive pulmonary disease (COPD), including emphysema and chronic bronchitis. Do not use this medicine alone for an acute attack.  How should I use this medicine?  This medicine is used in a nebulizer. Nebulizers make a liquid into an aerosol that you breathe in through your mouth or your mouth and nose into your lungs. You will be taught how to use your nebulizer. Follow the directions on your prescription label. Do not use more often than directed. Do not stop taking except on your doctor's advice.  Talk to your pediatrician regarding the use of this medicine in children. Special care may be needed.  What side effects may I notice from receiving this medicine?  Side effects that you should report to your doctor or health care professional as soon as possible:  · allergic reactions like skin rash, itching or hives, swelling of the face, lips, or tongue  · difficulty breathing or wheezing that increases or does not go away  · dizziness  · eye pain  · fast or irregular heartbeat  · infection or fever  Side effects that usually do not require medical attention (report to your doctor or health care professional if they continue or are bothersome):  · blurred vision  · cough  · dry mouth  · headache  · nausea or constipation  · trouble passing urine  What may interact with this medicine?  Ask your health care professional before you mix any medicines in the same dose of your nebulizer.  What if I miss a dose?  If you miss a dose, take it as soon as you can. If it is almost time for your next dose, take only that dose. Do not take double or extra doses.  Where should I keep my medicine?  Keep out of the reach of children.  Store at room temperature between 15 and 30 degrees C (59 and 86  degrees F). Protect from light. Keep the vials in the carton until you are ready to use. Throw away any unused medicine after the expiration date.  What should I tell my health care provider before I take this medicine?  They need to know if you have any of the following conditions:  · bladder problems or difficulty passing urine  · glaucoma  · heart disease or irregular heartbeat  · prostate trouble  · an unusual or allergic reaction to ipratropium, atropine, bromides, soya protein, peanut oil, soybeans or peanuts, other medicines, foods, dyes, or preservatives  · pregnant or trying to get pregnant  · breast-feeding  What should I watch for while using this medicine?  Visit your doctor for regular checks on your progress. Tell your doctor or health care professional if your symptoms do not improve. Do not use extra medicine. If your breathing gets worse or if you need short acting inhalers more often, call your doctor right away.  NOTE:This sheet is a summary. It may not cover all possible information. If you have questions about this medicine, talk to your doctor, pharmacist, or health care provider. Copyright© 2017 [employer]

## 2018-05-23 NOTE — PROGRESS NOTES
Subjective:       Patient ID: Daniel Jones is a 77 y.o. female.    Chief Complaint: She       MAIC (mycobacterium avium-intracellulare complex)    HPI   Mycobacterium Avium Intracellularie:  On zithro 3x a week  Jet nebs - Atrovent BID    Chronic Obstructive Pulmonary Disease / Bronchiectesis  Bronchiectasis  She presents for evaluation and treatment of bronchiectasis. The patient was diagnosed with bronchiectasis on 8/1/2017 on the basis of a CT scan. The bronchiectasis was localized to the lingula and localized to the right middle lobe. The patient has a history of atypical mycobacterium infection, first diagnosed 8/2017 at [facility] . Prior testing has included pulmonary function testing and sputum studies for fungus and mycobacteria which revealed evidence of bronchiectesis. The patient is having constitutional symptoms, including weight loss. There evidence of MAIC history of pneumonia, and the patient has not been hospitalized for this condition before. She has a history of 5 pack years. The patient has no exercise limitations. Prior treatments have included antibiotics for MAIC, which have provided no relief of symptoms.         Chest Pain:  Daniel Jones complains of chest - left shoulder discomfort. Onset was 3 months ago. Symptoms have worsened since that time. The patient's pain is intermittent. The patient describes the pain as achiness and does not radiate. Patient rates pain as a 4/10 in intensity. Associated symptoms are: none. Aggravating factors are: none. Alleviating factors are: none. Patient's cardiac risk factors are: advanced age (older than 55 for men, 65 for women). Patient's risk factors for DVT/PE: none. Previous cardiac testing: none.      Past Medical History:   Diagnosis Date    Arthritis     Hypertension     Thyroid disease      Past Surgical History:   Procedure Laterality Date    tonsilectomy N/A      Social History     Social History     Marital status:      Spouse name: N/A    Number of children: N/A    Years of education: N/A     Occupational History    Not on file.     Social History Main Topics    Smoking status: Former Smoker     Types: Cigarettes     Quit date: 1970    Smokeless tobacco: Never Used    Alcohol use No    Drug use: No    Sexual activity: No     Other Topics Concern    Not on file     Social History Narrative    No narrative on file     Review of Systems   Constitutional: Positive for fatigue. Negative for fever.   HENT: Positive for postnasal drip, rhinorrhea and congestion.    Eyes: Negative for redness and itching.   Respiratory: Positive for cough, sputum production, shortness of breath, dyspnea on extertion, use of rescue inhaler and Paroxysmal Nocturnal Dyspnea.    Cardiovascular: Negative for chest pain, palpitations and leg swelling.   Genitourinary: Negative for difficulty urinating and hematuria.   Endocrine: Negative for cold intolerance and heat intolerance.    Skin: Negative for rash.   Gastrointestinal: Negative for nausea and abdominal pain.   Neurological: Negative for dizziness, syncope, weakness and light-headedness.   Hematological: Negative for adenopathy. Does not bruise/bleed easily.   Psychiatric/Behavioral: Negative for sleep disturbance. The patient is not nervous/anxious.        Objective:      Physical Exam   Constitutional: She is oriented to person, place, and time. She appears well-developed and well-nourished.   HENT:   Head: Normocephalic and atraumatic.   Mouth/Throat: Oropharyngeal exudate present.   Eyes: Conjunctivae are normal. Pupils are equal, round, and reactive to light.   Neck: Neck supple. No JVD present. No tracheal deviation present. No thyromegaly present.   Cardiovascular: Normal rate, regular rhythm and normal heart sounds.    Pulmonary/Chest: Effort normal. No respiratory distress. She has decreased breath sounds. She has wheezes in the right lower field and the  left lower field. She has no rhonchi. She has no rales. She exhibits no tenderness.   Abdominal: Soft. Bowel sounds are normal.   Musculoskeletal: Normal range of motion. She exhibits no edema.   Lymphadenopathy:     She has no cervical adenopathy.   Neurological: She is alert and oriented to person, place, and time.   Skin: Skin is warm and dry.   Nursing note and vitals reviewed.    Personal Diagnostic Review  CT of chest performed on 4/28/2018 without contrast revealed bronchiectesis.  CT Chest without Contrast8/9/2017  [facility]  Result Impression   Scattered reticular nodular opacities in the lungs, centered at the right  middle lobe and lingular segment left upper lobe with additional nodules in the  lingular segment as well as cylindrical bronchiectasis at both of these  locations.  The appearance and distribution is typical of atypical  mycobacterial infection.  Other chronic findings as described above.  All CT scans at this facility use dose modulation, iterative reconstruction,  and/or weight base dosing when appropriate to reduce radiation dose to as low  as reasonably achievable.   Result Narrative   EXAM:   CT  CHEST WITHOUT CONTRAST  CLINICAL HISTORY:  Other nonspecific abnormal finding of lung field.  Abnormal  chest x-ray.  COMPARISON STUDIES: Chest x-ray 07/27/2017  TECHNIQUE:  CT scan performed of the chest without IV contrast.  FINDINGS:  Heart: Heart size normal.  Small amount of scattered coronary arterial  calcifications.  Mediastinum: Small subcentimeter lymph nodes without significant enlargement.  Vasculature: Moderate aortic atherosclerosis.  Lungs: Some patchy scattered reticular nodular type opacities are present  throughout the lungs though greatest in the right middle lobe and lingular  segment in the left upper lobe.  There also are a couple large areas of  nodularity in the lingular segment of the left upper lobe each measuring  roughly 1 cm in  size.  There is some cylindrical bronchiectasis at the right  middle lobe and the very inferior lingular segment with a small amount of  scarring or atelectasis.  There are no pleural effusions.  There is a small  fat-containing Bochdalek hernia on the left.  Visualized upper abdomen: Large amount of calcified plaque aorta.  Bones: Low-grade degenerative changes in the spine.  Chest wall: Unremarkable.  Dictated and Electronically Signed By: Farhad Gannon MD on August 9, 2017     AFB culture and stain8/23/2017  [facility]  Component Name Value Ref Range   Culture AFB Mycobacterium avium complex (A)   Comment:  See Separate Reference Lab Report for additional results - [id number]  Identification and susceptibilities performed by:  [facility]  [address] [place]  [address]  This is an updated result. Previous organism was POSITIVE for AFB (isolated from liquid media) on 9/28/2017 at 1019 CDT.     AFB Stain No acid fast bacilli seen on fluorescent stain.     AFB Stain Rapid growing AFB such as M. chelonae, M. abscessus, and M. fortuitum may not fluoresce.              Office Spirometry Results:     No flowsheet data found.  Pulmonary Studies Review 5/23/2018   SpO2 96   Height 62.000   Weight 2003.54   BMI (Calculated) 23   Predicted Distance 297.23   Predicted Distance Meters (Calculated) 435.75         Assessment:       1. Chronic obstructive pulmonary disease, unspecified COPD type    2. Bronchiectasis without complication        Outpatient Encounter Prescriptions as of 5/23/2018   Medication Sig Dispense Refill    amitriptyline (ELAVIL) 10 MG tablet Take 10 mg by mouth nightly.      amLODIPine (NORVASC) 5 MG tablet Take 1 tablet by mouth once daily.      aspirin (ECOTRIN) 325 MG EC tablet Take 325 mg by mouth nightly.      azithromycin (Z-CATIA) 250 MG tablet Take 1 tablet (250 mg total) by mouth every Mon, Wed, Fri. 12 tablet 11    coenzyme Q10 (CO Q-10)  100 mg capsule Take 100 mg by mouth once daily.      esomeprazole (NEXIUM) 40 MG capsule Take 40 mg by mouth before breakfast.      etodolac (LODINE) 400 MG tablet Take 1 tablet by mouth once daily.      flu vac-2017 [id number],PF, (FLUAD 9498-1106, 65 YR UP,,PF,) 45 mcg (15 mcg x 3)/0.5 mL Syrg 0.5 mLs.      ipratropium (ATROVENT) 0.02 % nebulizer solution Take 2.5 mLs (500 mcg total) by nebulization 4 (four) times daily. 120 vial 11    levothyroxine (SYNTHROID) 88 MCG tablet Take 88 mcg by mouth once daily.      losartan (COZAAR) 100 MG tablet Take 100 mg by mouth once daily.      montelukast (SINGULAIR) 10 mg tablet Take 1 tablet (10 mg total) by mouth once daily. 90 tablet 4    rosuvastatin (CRESTOR) 5 MG tablet Take 5 mg by mouth once daily.      VIOS AEROSOL DELIVERY SYSTEM Abby USE 1 VIAL NEBULIZED Q 6 H WHILE AWAKE  0    [DISCONTINUED] albuterol (PROVENTIL) 2.5 mg /3 mL (0.083 %) nebulizer solution Take 3 mLs (2.5 mg total) by nebulization every 6 (six) hours while awake. 360 mL 12    [DISCONTINUED] ipratropium (ATROVENT) 0.02 % nebulizer solution Take 2.5 mLs (500 mcg total) by nebulization 4 (four) times daily. 120 vial 11    [DISCONTINUED] montelukast (SINGULAIR) 10 mg tablet Take 1 tablet (10 mg total) by mouth once daily. 90 tablet 4    aluminum-magnesium hydroxide-simethicone (MAALOX) 200-200-20 mg/5 mL Susp Take 30 mLs by mouth 4 (four) times daily before meals and nightly. 354 mL prn    fluocinonide 0.05% (LIDEX) 0.05 % cream LEODAN 1 APPLICATION ON THE SKIN BID       No facility-administered encounter medications on file as of 5/23/2018.      Orders Placed This Encounter   Procedures    Spirometry with/without bronchodilator     Standing Status:   Future     Standing Expiration Date:   5/23/2019     Plan:       Requested Prescriptions     Signed Prescriptions Disp Refills    montelukast (SINGULAIR) 10 mg tablet 90 tablet 4     Sig: Take 1 tablet (10 mg total) by mouth once daily.     ipratropium (ATROVENT) 0.02 % nebulizer solution 120 vial 11     Sig: Take 2.5 mLs (500 mcg total) by nebulization 4 (four) times daily.     Chronic obstructive pulmonary disease, unspecified COPD type  -     montelukast (SINGULAIR) 10 mg tablet; Take 1 tablet (10 mg total) by mouth once daily.  Dispense: 90 tablet; Refill: 4  -     ipratropium (ATROVENT) 0.02 % nebulizer solution; Take 2.5 mLs (500 mcg total) by nebulization 4 (four) times daily.  Dispense: 120 vial; Refill: 11  -     Spirometry with/without bronchodilator; Future; Expected date: 11/23/2018    Bronchiectasis without complication  -     Spirometry with/without bronchodilator; Future; Expected date: 11/23/2018           Follow-up in about 6 months (around 11/23/2018) for Review [place].    MEDICAL DECISION MAKING: Moderate to high complexity.  Overall, the multiple problems listed are of moderate to high severity that may impact quality of life and activities of daily living. Side effects of medications, treatment plan as well as options and alternatives reviewed and discussed with patient. There was counseling of patient concerning these issues.    Total time spent in face to face counseling and coordination of care - 40  minutes over 50% of time was used in discussion of prognosis, risks, benefits of treatment, instructions and compliance with regimen . Discussion with other physicians or health care providers (DME, NP, pharmacy, respiratory therapy) occurred.

## 2018-05-31 DIAGNOSIS — J44.9 CHRONIC OBSTRUCTIVE PULMONARY DISEASE, UNSPECIFIED COPD TYPE: ICD-10-CM

## 2018-05-31 RX ORDER — IPRATROPIUM BROMIDE 0.5 MG/2.5ML
500 SOLUTION RESPIRATORY (INHALATION) 4 TIMES DAILY
Qty: 900 ML | Refills: 3 | Status: SHIPPED | OUTPATIENT
Start: 2018-05-31 | End: 2021-08-11 | Stop reason: ALTCHOICE

## 2018-09-04 ENCOUNTER — OFFICE VISIT (OUTPATIENT)
Dept: PULMONOLOGY | Facility: CLINIC | Age: 78
End: 2018-09-04
Payer: MEDICARE

## 2018-09-04 ENCOUNTER — PROCEDURE VISIT (OUTPATIENT)
Dept: PULMONOLOGY | Facility: CLINIC | Age: 78
End: 2018-09-04
Payer: MEDICARE

## 2018-09-04 VITALS
SYSTOLIC BLOOD PRESSURE: 118 MMHG | HEART RATE: 61 BPM | DIASTOLIC BLOOD PRESSURE: 72 MMHG | RESPIRATION RATE: 16 BRPM | OXYGEN SATURATION: 97 % | BODY MASS INDEX: 22.9 KG/M2 | HEIGHT: 62 IN

## 2018-09-04 DIAGNOSIS — J44.9 CHRONIC OBSTRUCTIVE PULMONARY DISEASE, UNSPECIFIED COPD TYPE: ICD-10-CM

## 2018-09-04 DIAGNOSIS — J47.9 BRONCHIECTASIS WITHOUT COMPLICATION: ICD-10-CM

## 2018-09-04 DIAGNOSIS — M19.012 ARTHRITIS OF LEFT SHOULDER REGION: Primary | ICD-10-CM

## 2018-09-04 DIAGNOSIS — A31.0 MAIC (MYCOBACTERIUM AVIUM-INTRACELLULARE COMPLEX): ICD-10-CM

## 2018-09-04 PROBLEM — E78.2 MIXED HYPERLIPIDEMIA: Status: ACTIVE | Noted: 2018-08-16

## 2018-09-04 LAB
BRPFT: ABNORMAL
DLCO ADJ PRE: 16.7 ML/(MIN*MMHG) (ref 13.31–24.78)
DLCO PRE: 16.7 ML/(MIN*MMHG) (ref 13.31–24.78)
DLCO SINGLE BREATH LLN: 13.31
DLCO SINGLE BREATH PRE REF: 87.7 %
DLCO SINGLE BREATH REF: 19.05
DLCOC SBVA LLN: 2.62
DLCOC SBVA PRE REF: 118.9 %
DLCOC SBVA REF: 4.17
DLCOC SINGLE BREATH LLN: 13.31
DLCOC SINGLE BREATH PRE REF: 87.7 %
DLCOC SINGLE BREATH REF: 19.05
DLCOVA LLN: 2.62
DLCOVA PRE REF: 118.9 %
DLCOVA PRE: 4.95 ML/(MIN*MMHG*L) (ref 2.62–5.71)
DLCOVA REF: 4.17
DLVAADJ PRE: 4.95 ML/(MIN*MMHG*L) (ref 2.62–5.71)
ERV LLN: 0.54
ERV PRE REF: 76.6 %
ERV PRE: 0.41 L (ref 0.54–0.54)
ERV REF: 0.54
ERVN2 LLN: 0.54
ERVN2 REF: 0.54
FEF 25 75 CHG: 14.4 %
FEF 25 75 LLN: 0.69
FEF 25 75 POST REF: 54.5 %
FEF 25 75 POST: 0.86 L/S (ref 0.69–2.48)
FEF 25 75 PRE REF: 47.7 %
FEF 25 75 PRE: 0.76 L/S (ref 0.69–2.48)
FEF 25 75 REF: 1.58
FET100 CHG: -6.5 %
FET100 POST: 11.19 SEC
FET100 PRE: 11.98 SEC
FEV1 CHG: -0.9 %
FEV1 FVC CHG: 5.3 %
FEV1 FVC LLN: 64
FEV1 FVC POST REF: 88 %
FEV1 FVC POST: 68.34 % (ref 63.51–91.87)
FEV1 FVC PRE REF: 83.5 %
FEV1 FVC PRE: 64.87 % (ref 63.51–91.87)
FEV1 FVC REF: 78
FEV1 LLN: 1.34
FEV1 POST REF: 86 %
FEV1 POST: 1.62 L (ref 1.34–2.43)
FEV1 PRE REF: 86.8 %
FEV1 PRE: 1.64 L (ref 1.34–2.43)
FEV1 REF: 1.89
FEV6 CHG: -3.9 %
FEV6 LLN: 1.75
FEV6 POST REF: 94.4 %
FEV6 POST: 2.25 L (ref 1.75–3.01)
FEV6 PRE REF: 98.2 %
FEV6 PRE: 2.34 L (ref 1.75–3.01)
FEV6 REF: 2.38
FRCN2 LLN: 1.77
FRCN2 REF: 2.59
FRCPL PRE: 3.58 L
FRCPLETH LLN: 1.77
FRCPLETH PREREF: 138.2 %
FRCPLETH REF: 2.59
FVC CHG: -5.9 %
FVC LLN: 1.75
FVC POST REF: 96.8 %
FVC POST: 2.38 L (ref 1.75–3.16)
FVC PRE REF: 103 %
FVC PRE: 2.52 L (ref 1.75–3.16)
FVC REF: 2.45
IVC PRE: 2.21 L (ref 1.75–3.16)
IVC SINGLE BREATH LLN: 1.75
IVC SINGLE BREATH PRE REF: 90 %
IVC SINGLE BREATH REF: 2.45
MVV LLN: 58
MVV PRE REF: 64.6 %
MVV PRE: 44 L/MIN (ref 57.85–78.27)
MVV REF: 68
PEF CHG: 11.5 %
PEF LLN: 3.23
PEF POST REF: 107.4 %
PEF POST: 5.19 L/S (ref 3.23–6.42)
PEF PRE REF: 96.3 %
PEF PRE: 4.65 L/S (ref 3.23–6.42)
PEF REF: 4.83
RAW LLN: 3.06
RAW PRE REF: 157.9 %
RAW PRE: 4.83 CMH2O*S/L (ref 3.06–3.06)
RAW REF: 3.06
RV LLN: 1.48
RV PRE REF: 144.7 %
RV PRE: 2.98 L (ref 1.48–2.63)
RV REF: 2.06
RVN2 LLN: 1.48
RVN2 REF: 2.06
RVN2TLCN2 LLN: 35
RVN2TLCN2 REF: 45
RVTLC LLN: 35
RVTLC PRE REF: 120.8 %
RVTLC PRE: 54.11 % (ref 35.21–54.39)
RVTLC REF: 45
TLC LLN: 3.59
TLC PRE REF: 120.3 %
TLC PRE: 5.5 L (ref 3.58–5.56)
TLC REF: 4.57
TLCN2 LLN: 3.59
TLCN2 REF: 4.57
VA PRE: 3.37 L (ref 4.42–4.42)
VA SINGLE BREATH LLN: 4.42
VA SINGLE BREATH PRE REF: 76.3 %
VA SINGLE BREATH REF: 4.42
VC LLN: 1.75
VC PRE REF: 103 %
VC PRE: 2.52 L (ref 1.75–3.16)
VC REF: 2.45
VCMAXN2 LLN: 1.75
VCMAXN2 REF: 2.45
VTGRAWPRE: 3.3 L

## 2018-09-04 PROCEDURE — 94060 EVALUATION OF WHEEZING: CPT | Mod: 26,S$PBB,, | Performed by: INTERNAL MEDICINE

## 2018-09-04 PROCEDURE — 99215 OFFICE O/P EST HI 40 MIN: CPT | Mod: PBBFAC,PO | Performed by: INTERNAL MEDICINE

## 2018-09-04 PROCEDURE — 99999 PR PBB SHADOW E&M-EST. PATIENT-LVL V: CPT | Mod: PBBFAC,,, | Performed by: INTERNAL MEDICINE

## 2018-09-04 PROCEDURE — 94726 PLETHYSMOGRAPHY LUNG VOLUMES: CPT | Mod: 26,S$PBB,, | Performed by: INTERNAL MEDICINE

## 2018-09-04 PROCEDURE — 99214 OFFICE O/P EST MOD 30 MIN: CPT | Mod: 25,S$PBB,, | Performed by: INTERNAL MEDICINE

## 2018-09-04 PROCEDURE — 94729 DIFFUSING CAPACITY: CPT | Mod: 26,S$PBB,, | Performed by: INTERNAL MEDICINE

## 2018-09-04 PROCEDURE — 94726 PLETHYSMOGRAPHY LUNG VOLUMES: CPT | Mod: PBBFAC,PO

## 2018-09-04 PROCEDURE — 94060 EVALUATION OF WHEEZING: CPT | Mod: PBBFAC,PO

## 2018-09-04 PROCEDURE — 94729 DIFFUSING CAPACITY: CPT | Mod: PBBFAC,PO

## 2018-09-04 NOTE — PATIENT INSTRUCTIONS
Azithromycin tablets  What is this medicine?  AZITHROMYCIN (az ith reggie MYE sin) is a macrolide antibiotic. It is used to treat or prevent certain kinds of bacterial infections. It will not work for colds, flu, or other viral infections.  How should I use this medicine?  Take this medicine by mouth with a full glass of water. Follow the directions on the prescription label. The tablets can be taken with food or on an empty stomach. If the medicine upsets your stomach, take it with food. Take your medicine at regular intervals. Do not take your medicine more often than directed. Take all of your medicine as directed even if you think your are better. Do not skip doses or stop your medicine early. Talk to your pediatrician regarding the use of this medicine in children. Special care may be needed.  What side effects may I notice from receiving this medicine?  Side effects that you should report to your doctor or health care professional as soon as possible:  · allergic reactions like skin rash, itching or hives, swelling of the face, lips, or tongue  · confusion, nightmares or hallucinations  · dark urine  · difficulty breathing  · hearing loss  · irregular heartbeat or chest pain  · pain or difficulty passing urine  · redness, blistering, peeling or loosening of the skin, including inside the mouth  · white patches or sores in the mouth  · yellowing of the eyes or skin  Side effects that usually do not require medical attention (report to your doctor or health care professional if they continue or are bothersome):  · diarrhea  · dizziness, drowsiness  · headache  · stomach upset or vomiting  · tooth discoloration  · vaginal irritation  What may interact with this medicine?  Do not take this medicine with any of the following medications:  · lincomycin  This medicine may also interact with the following medications:  · amiodarone  · antacids  · birth control  pills  · cyclosporine  · digoxin  · magnesium  · nelfinavir  · phenytoin  · warfarin  What if I miss a dose?  If you miss a dose, take it as soon as you can. If it is almost time for your next dose, take only that dose. Do not take double or extra doses.  Where should I keep my medicine?  Keep out of the reach of children.  Store at room temperature between 15 and 30 degrees C (59 and 86 degrees F). Throw away any unused medicine after the expiration date.  What should I tell my health care provider before I take this medicine?  They need to know if you have any of these conditions:  · kidney disease  · liver disease  · irregular heartbeat or heart disease  · an unusual or allergic reaction to azithromycin, erythromycin, other macrolide antibiotics, foods, dyes, or preservatives  · pregnant or trying to get pregnant  · breast-feeding  What should I watch for while using this medicine?  Tell your doctor or health care professional if your symptoms do not improve.  Do not treat diarrhea with over the counter products. Contact your doctor if you have diarrhea that lasts more than 2 days or if it is severe and watery.  This medicine can make you more sensitive to the sun. Keep out of the sun. If you cannot avoid being in the sun, wear protective clothing and use sunscreen. Do not use sun lamps or tanning beds/booths.  NOTE:This sheet is a summary. It may not cover all possible information. If you have questions about this medicine, talk to your doctor, pharmacist, or health care provider. Copyright© 2017 [employer]        Ipratropium solution for inhalation  What is this medicine?  IPRATROPIUM (ruy huff) is a bronchodilator. It helps open up the airways in your lungs to make it easier to breathe. This medicine is used to treat chronic obstructive pulmonary disease (COPD), including emphysema and chronic bronchitis. Do not use this medicine alone for an acute attack.  How should I use this medicine?  This  medicine is used in a nebulizer. Nebulizers make a liquid into an aerosol that you breathe in through your mouth or your mouth and nose into your lungs. You will be taught how to use your nebulizer. Follow the directions on your prescription label. Do not use more often than directed. Do not stop taking except on your doctor's advice.  Talk to your pediatrician regarding the use of this medicine in children. Special care may be needed.  What side effects may I notice from receiving this medicine?  Side effects that you should report to your doctor or health care professional as soon as possible:  · allergic reactions like skin rash, itching or hives, swelling of the face, lips, or tongue  · difficulty breathing or wheezing that increases or does not go away  · dizziness  · eye pain  · fast or irregular heartbeat  · infection or fever  Side effects that usually do not require medical attention (report to your doctor or health care professional if they continue or are bothersome):  · blurred vision  · cough  · dry mouth  · headache  · nausea or constipation  · trouble passing urine  What may interact with this medicine?  Ask your health care professional before you mix any medicines in the same dose of your nebulizer.  What if I miss a dose?  If you miss a dose, take it as soon as you can. If it is almost time for your next dose, take only that dose. Do not take double or extra doses.  Where should I keep my medicine?  Keep out of the reach of children.  Store at room temperature between 15 and 30 degrees C (59 and 86 degrees F). Protect from light. Keep the vials in the carton until you are ready to use. Throw away any unused medicine after the expiration date.  What should I tell my health care provider before I take this medicine?  They need to know if you have any of the following conditions:  · bladder problems or difficulty passing urine  · glaucoma  · heart disease or irregular heartbeat  · prostate  trouble  · an unusual or allergic reaction to ipratropium, atropine, bromides, soya protein, peanut oil, soybeans or peanuts, other medicines, foods, dyes, or preservatives  · pregnant or trying to get pregnant  · breast-feeding  What should I watch for while using this medicine?  Visit your doctor for regular checks on your progress. Tell your doctor or health care professional if your symptoms do not improve. Do not use extra medicine. If your breathing gets worse or if you need short acting inhalers more often, call your doctor right away.  NOTE:This sheet is a summary. It may not cover all possible information. If you have questions about this medicine, talk to your doctor, pharmacist, or health care provider. Copyright© 2017 [employer]        Ipratropium solution for inhalation  What is this medicine?  IPRATROPIUM (i shai caballero um) is a bronchodilator. It helps open up the airways in your lungs to make it easier to breathe. This medicine is used to treat chronic obstructive pulmonary disease (COPD), including emphysema and chronic bronchitis. Do not use this medicine alone for an acute attack.  How should I use this medicine?  This medicine is used in a nebulizer. Nebulizers make a liquid into an aerosol that you breathe in through your mouth or your mouth and nose into your lungs. You will be taught how to use your nebulizer. Follow the directions on your prescription label. Do not use more often than directed. Do not stop taking except on your doctor's advice.  Talk to your pediatrician regarding the use of this medicine in children. Special care may be needed.  What side effects may I notice from receiving this medicine?  Side effects that you should report to your doctor or health care professional as soon as possible:  · allergic reactions like skin rash, itching or hives, swelling of the face, lips, or tongue  · difficulty breathing or wheezing that increases or does not go away  · dizziness  · eye  pain  · fast or irregular heartbeat  · infection or fever  Side effects that usually do not require medical attention (report to your doctor or health care professional if they continue or are bothersome):  · blurred vision  · cough  · dry mouth  · headache  · nausea or constipation  · trouble passing urine  What may interact with this medicine?  Ask your health care professional before you mix any medicines in the same dose of your nebulizer.  What if I miss a dose?  If you miss a dose, take it as soon as you can. If it is almost time for your next dose, take only that dose. Do not take double or extra doses.  Where should I keep my medicine?  Keep out of the reach of children.  Store at room temperature between 15 and 30 degrees C (59 and 86 degrees F). Protect from light. Keep the vials in the carton until you are ready to use. Throw away any unused medicine after the expiration date.  What should I tell my health care provider before I take this medicine?  They need to know if you have any of the following conditions:  · bladder problems or difficulty passing urine  · glaucoma  · heart disease or irregular heartbeat  · prostate trouble  · an unusual or allergic reaction to ipratropium, atropine, bromides, soya protein, peanut oil, soybeans or peanuts, other medicines, foods, dyes, or preservatives  · pregnant or trying to get pregnant  · breast-feeding  What should I watch for while using this medicine?  Visit your doctor for regular checks on your progress. Tell your doctor or health care professional if your symptoms do not improve. Do not use extra medicine. If your breathing gets worse or if you need short acting inhalers more often, call your doctor right away.  NOTE:This sheet is a summary. It may not cover all possible information. If you have questions about this medicine, talk to your doctor, pharmacist, or health care provider. Copyright© 2017 [employer]

## 2018-09-04 NOTE — PROGRESS NOTES
Subjective:       Patient ID: Daniel Jones is a 78 y.o. female.    Chief Complaint: She       COPD and MAIC (mycobacterium avium-intracellulare complex)    HPI     Mycobacterium Avium Intracellularie:  On zithro 3x a week  Unable to tolerate rifampin and ethambutol  Jet nebs - Atrovent BID   C/O of left sided shoulder pain     Chronic Obstructive Pulmonary Disease / Bronchiectesis  Bronchiectasis  She presents for evaluation and treatment of bronchiectasis. The patient was diagnosed with bronchiectasis on 8/1/2017 on the basis of a CT scan. The bronchiectasis was localized to the lingula and localized to the right middle lobe. The patient has a history of atypical mycobacterium infection, first diagnosed 8/2017 at [facility] . Prior testing has included pulmonary function testing and sputum studies for fungus and mycobacteria which revealed evidence of bronchiectesis. The patient is having constitutional symptoms, including weight loss. There evidence of MAIC history of pneumonia, and the patient has not been hospitalized for this condition before. She has a history of 5 pack years. The patient has no exercise limitations. Prior treatments have included antibiotics for MAIC, which have provided no relief of symptoms.      Chest Pain:  Daniel Jones complains of chest - left shoulder discomfort. Onset was 6 months ago. Symptoms have worsened since that time. The patient's pain is intermittent. The patient describes the pain as achiness and does not radiate. Patient rates pain as a  6/10 in intensity. Associated symptoms are: none. Aggravating factors are: none. Alleviating factors are: none. Patient's cardiac risk factors are: advanced age (older than 55 for men, 65 for women). Patient's risk factors for DVT/PE: none. Previous cardiac testing: none.        Past Medical History:   Diagnosis Date    Arthritis     Hypertension     Thyroid disease      Past Surgical History:    Procedure Laterality Date    tonsilectomy N/A      Social History     Socioeconomic History    Marital status:      Spouse name: Not on file    Number of children: Not on file    Years of education: Not on file    Highest education level: Not on file   Social Needs    Financial resource strain: Not on file    Food insecurity - worry: Not on file    Food insecurity - inability: Not on file    Transportation needs - medical: Not on file    Transportation needs - non-medical: Not on file   Occupational History    Not on file   Tobacco Use    Smoking status: Former Smoker     Types: Cigarettes     Last attempt to quit: 1970     Years since quittin.7    Smokeless tobacco: Never Used   Substance and Sexual Activity    Alcohol use: No    Drug use: No    Sexual activity: No   Other Topics Concern    Not on file   Social History Narrative    Not on file     Review of Systems   Constitutional: Positive for fatigue. Negative for fever.   HENT: Positive for rhinorrhea and congestion. Negative for postnasal drip.    Eyes: Negative for redness and itching.   Respiratory: Positive for cough, sputum production, shortness of breath, dyspnea on extertion and use of rescue inhaler. Negative for Paroxysmal Nocturnal Dyspnea.    Cardiovascular: Negative for chest pain, palpitations and leg swelling.   Genitourinary: Negative for difficulty urinating and hematuria.   Endocrine: Negative for cold intolerance and heat intolerance.    Musculoskeletal: Positive for arthralgias.   Skin: Negative for rash.   Gastrointestinal: Negative for nausea and abdominal pain.   Neurological: Negative for dizziness, syncope, weakness and light-headedness.   Hematological: Negative for adenopathy. Does not bruise/bleed easily.   Psychiatric/Behavioral: Negative for sleep disturbance. The patient is not nervous/anxious.        Objective:      Physical Exam   Constitutional: She is oriented to person, place, and time. She  appears well-developed and well-nourished.   HENT:   Head: Normocephalic and atraumatic.   Mouth/Throat: Oropharyngeal exudate present.   Eyes: Conjunctivae are normal. Pupils are equal, round, and reactive to light.   Neck: Neck supple. No JVD present. No tracheal deviation present. No thyromegaly present.   Cardiovascular: Normal rate, regular rhythm and normal heart sounds.   Pulmonary/Chest: Effort normal. No respiratory distress. She has decreased breath sounds. She has no wheezes. She has rales in the right lower field and the left lower field. She exhibits no tenderness.   Abdominal: Soft. Bowel sounds are normal.   Musculoskeletal: She exhibits no edema.   Decreased range of motion of  Left shoulder.     Lymphadenopathy:     She has no cervical adenopathy.   Neurological: She is alert and oriented to person, place, and time.   Skin: Skin is warm and dry.   Nursing note and vitals reviewed.    Personal Diagnostic Review  Chest x-ray: bilateral bronchiectesis  Office Spirometry Results:     No flowsheet data found.  Pulmonary Studies Review 9/4/2018   SpO2 97   Height 62.000   Weight -   BMI (Calculated) -   Predicted Distance 434.92   Predicted Distance Meters (Calculated) -         Assessment:       1. Arthritis of left shoulder region    2. Bronchiectasis without complication    3. Chronic obstructive pulmonary disease, unspecified COPD type    4. MAIC (mycobacterium avium-intracellulare complex)        Outpatient Encounter Medications as of 9/4/2018   Medication Sig Dispense Refill    amitriptyline (ELAVIL) 10 MG tablet Take 10 mg by mouth nightly.      amLODIPine (NORVASC) 5 MG tablet Take 1 tablet by mouth once daily.      aspirin (ECOTRIN) 325 MG EC tablet Take 325 mg by mouth nightly.      azithromycin (Z-CATIA) 250 MG tablet Take 1 tablet (250 mg total) by mouth every Mon, Wed, Fri. 12 tablet 11    coenzyme Q10 (CO Q-10) 100 mg capsule Take 100 mg by mouth once daily.      esomeprazole (NEXIUM) 40  MG capsule Take 40 mg by mouth before breakfast.      etodolac (LODINE) 400 MG tablet Take 1 tablet by mouth once daily.      flu vac-2017 [id number],PF, (FLUAD 6347-3048, 65 YR UP,,PF,) 45 mcg (15 mcg x 3)/0.5 mL Syrg 0.5 mLs.      fluocinonide 0.05% (LIDEX) 0.05 % cream LEODAN 1 APPLICATION ON THE SKIN BID      ipratropium (ATROVENT) 0.02 % nebulizer solution Take 2.5 mLs (500 mcg total) by nebulization 4 (four) times daily. 900 mL 3    levothyroxine (SYNTHROID) 88 MCG tablet Take 88 mcg by mouth once daily.      losartan (COZAAR) 100 MG tablet Take 100 mg by mouth once daily.      montelukast (SINGULAIR) 10 mg tablet Take 1 tablet (10 mg total) by mouth once daily. 90 tablet 4    rosuvastatin (CRESTOR) 5 MG tablet Take 5 mg by mouth once daily.      VIOS AEROSOL DELIVERY SYSTEM Abby USE 1 VIAL NEBULIZED Q 6 H WHILE AWAKE  0    aluminum-magnesium hydroxide-simethicone (MAALOX) 200-200-20 mg/5 mL Susp Take 30 mLs by mouth 4 (four) times daily before meals and nightly. 354 mL prn     No facility-administered encounter medications on file as of 9/4/2018.      Orders Placed This Encounter   Procedures    MRI Shoulder Without Contrast Left     Standing Status:   Future     Standing Expiration Date:   9/4/2019     Order Specific Question:   Does the patient have a pacemaker or a defibrilator?     Answer:   No     Order Specific Question:   Does the patient have a cerebral aneurysm or surgical clip, pump, nerve or brain stimulator, middle or inner ear prosthesis, or other metal implant or  been injured by a metal object(i.e. bullet, bb, shrapnel)?     Answer:   No     Order Specific Question:   Is the patient claustrophobic?     Answer:   No     Order Specific Question:   Will the patient require sedation?     Answer:   No     Order Specific Question:   Does the patient have any of the following conditions? Diabetes, History of Renal Disease or Hypertension requiring medical therapy?     Answer:   Yes      Order Specific Question:   May the Radiologist modify the order per protocol to meet the clinical needs of the patient?     Answer:   Yes     Order Specific Question:   Is this part of a Research Study?     Answer:   No     Order Specific Question:   Recist criteria?     Answer:   No     Order Specific Question:   Will this service be billed to a Worker's Comp policy?     Answer:   No     Order Specific Question:   Does the patient have on a skin patch for medication with aluminized backing?     Answer:   No    X-Ray Chest PA And Lateral     Standing Status:   Future     Standing Expiration Date:   3/4/2020     Order Specific Question:   Reason for Exam:     Answer:   SOB    Spirometry with/without bronchodilator     Standing Status:   Future     Standing Expiration Date:   9/4/2019     Plan:       Requested Prescriptions      No prescriptions requested or ordered in this encounter     Arthritis of left shoulder region  -     MRI Shoulder Without Contrast Left; Future; Expected date: 09/04/2018    Bronchiectasis without complication  -     Spirometry with/without bronchodilator; Future; Expected date: 03/07/2019  -     X-Ray Chest PA And Lateral; Future; Expected date: 09/04/2018    Chronic obstructive pulmonary disease, unspecified COPD type    MAIC (mycobacterium avium-intracellulare complex)           Follow-up in about 7 months (around 4/1/2019) for Review [place] and CXR.    MEDICAL DECISION MAKING: Moderate to high complexity.  Overall, the multiple problems listed are of moderate to high severity that may impact quality of life and activities of daily living. Side effects of medications, treatment plan as well as options and alternatives reviewed and discussed with patient. There was counseling of patient concerning these issues.    Total time spent in face to face counseling and coordination of care - 25  minutes over 50% of time was used in discussion of prognosis, risks, benefits of treatment, instructions  and compliance with regimen . Discussion with other physicians or health care providers (DME, NP, pharmacy, respiratory therapy) occurred.

## 2018-09-05 ENCOUNTER — HOSPITAL ENCOUNTER (OUTPATIENT)
Dept: RADIOLOGY | Facility: HOSPITAL | Age: 78
Discharge: HOME | End: 2018-09-05
Attending: INTERNAL MEDICINE
Payer: MEDICARE

## 2018-09-05 DIAGNOSIS — M19.012 ARTHRITIS OF LEFT SHOULDER REGION: ICD-10-CM

## 2018-09-05 PROCEDURE — 73221 MRI JOINT UPR EXTREM W/O DYE: CPT | Mod: 26,LT,, | Performed by: RADIOLOGY

## 2018-09-05 PROCEDURE — 73221 MRI JOINT UPR EXTREM W/O DYE: CPT | Mod: TC,PO,LT

## 2018-09-14 ENCOUNTER — TELEPHONE (OUTPATIENT)
Dept: PULMONOLOGY | Facility: CLINIC | Age: 78
End: 2018-09-14

## 2018-09-14 DIAGNOSIS — M25.512 ACUTE PAIN OF LEFT SHOULDER: Primary | ICD-10-CM

## 2018-09-17 ENCOUNTER — TELEPHONE (OUTPATIENT)
Dept: PULMONOLOGY | Facility: CLINIC | Age: 78
End: 2018-09-17

## 2018-09-17 DIAGNOSIS — M25.512 LEFT SHOULDER PAIN, UNSPECIFIED CHRONICITY: Primary | ICD-10-CM

## 2018-09-17 NOTE — TELEPHONE ENCOUNTER
----- Message from Verito Mason sent at 9/17/2018  8:13 AM CDT -----  Contact: self   Pt would like to consult with nurse regarding personal matter. Please call back at [phone].      Thanks,  Verito Mason

## 2018-09-17 NOTE — TELEPHONE ENCOUNTER
Pt accepted [facility] appointment on 9/24/2018 at 1400 with Dr Hall.  Pt provided times for all testing appointments.  Pt verbalized understanding.

## 2018-09-24 ENCOUNTER — OFFICE VISIT (OUTPATIENT)
Dept: ORTHOPEDICS | Facility: CLINIC | Age: 78
End: 2018-09-24
Payer: MEDICARE

## 2018-09-24 ENCOUNTER — HOSPITAL ENCOUNTER (OUTPATIENT)
Dept: RADIOLOGY | Facility: HOSPITAL | Age: 78
Discharge: HOME | End: 2018-09-24
Attending: INTERNAL MEDICINE
Payer: MEDICARE

## 2018-09-24 VITALS
WEIGHT: 125 LBS | SYSTOLIC BLOOD PRESSURE: 151 MMHG | BODY MASS INDEX: 23 KG/M2 | HEIGHT: 62 IN | HEART RATE: 81 BPM | DIASTOLIC BLOOD PRESSURE: 65 MMHG

## 2018-09-24 DIAGNOSIS — S46.012A STRAIN OF MUSCLE(S) AND TENDON(S) OF THE ROTATOR CUFF OF LEFT SHOULDER, INITIAL ENCOUNTER: Primary | ICD-10-CM

## 2018-09-24 DIAGNOSIS — M25.512 LEFT SHOULDER PAIN, UNSPECIFIED CHRONICITY: ICD-10-CM

## 2018-09-24 DIAGNOSIS — Z01.818 PREOP TESTING: Primary | ICD-10-CM

## 2018-09-24 DIAGNOSIS — M75.22 TENDINITIS OF UPPER BICEPS TENDON OF LEFT SHOULDER: ICD-10-CM

## 2018-09-24 DIAGNOSIS — M75.42 IMPINGEMENT SYNDROME OF LEFT SHOULDER: ICD-10-CM

## 2018-09-24 DIAGNOSIS — M75.42 IMPINGEMENT SYNDROME OF LEFT SHOULDER: Primary | ICD-10-CM

## 2018-09-24 PROCEDURE — 99999 PR PBB SHADOW E&M-EST. PATIENT-LVL III: CPT | Mod: PBBFAC,,, | Performed by: ORTHOPAEDIC SURGERY

## 2018-09-24 PROCEDURE — 73030 X-RAY EXAM OF SHOULDER: CPT | Mod: TC,FY,PO,LT

## 2018-09-24 PROCEDURE — 99213 OFFICE O/P EST LOW 20 MIN: CPT | Mod: PBBFAC,25,PO | Performed by: ORTHOPAEDIC SURGERY

## 2018-09-24 PROCEDURE — 99204 OFFICE O/P NEW MOD 45 MIN: CPT | Mod: S$PBB,,, | Performed by: ORTHOPAEDIC SURGERY

## 2018-09-24 PROCEDURE — 73030 X-RAY EXAM OF SHOULDER: CPT | Mod: 26,LT,, | Performed by: RADIOLOGY

## 2018-09-24 RX ORDER — MELOXICAM 15 MG/1
15 TABLET ORAL DAILY
Qty: 30 TABLET | Refills: 2 | Status: CANCELLED | OUTPATIENT
Start: 2018-09-24

## 2018-09-24 NOTE — H&P (VIEW-ONLY)
Subjective:     Patient ID: Daniel Jones is a 78 y.o. female.    Chief Complaint: Pain of the Left Shoulder    Patient is here for left shoulder pain. Reports it has been bothering her for 1 month. Reports no CARTER. Reports no previous injury.      Shoulder Pain    The pain is present in the left shoulder. This is a new problem. The current episode started more than 1 month ago. The problem occurs intermittently. The problem has been gradually worsening. The quality of the pain is described as sharp. The pain is at a severity of 3/10. Associated symptoms include stiffness. Pertinent negatives include no fever or numbness. The symptoms are aggravated by activity. She has tried rest and OTC pain meds for the symptoms. The treatment provided significant relief. Physical therapy was not tried.      Past Medical History:   Diagnosis Date    Arthritis     Hypertension     Thyroid disease      Past Surgical History:   Procedure Laterality Date    tonsilectomy N/A      Family History   Problem Relation Age of Onset    Cancer Mother     Heart failure Father     Hypertension Father     Diabetes Father     Diabetes Maternal Grandmother     Diabetes Paternal Grandmother      Social History     Socioeconomic History    Marital status:      Spouse name: Not on file    Number of children: Not on file    Years of education: Not on file    Highest education level: Not on file   Social Needs    Financial resource strain: Not on file    Food insecurity - worry: Not on file    Food insecurity - inability: Not on file    Transportation needs - medical: Not on file    Transportation needs - non-medical: Not on file   Occupational History    Not on file   Tobacco Use    Smoking status: Former Smoker     Types: Cigarettes     Last attempt to quit: 1970     Years since quittin.7    Smokeless tobacco: Never Used   Substance and Sexual Activity    Alcohol use: No    Drug use: No    Sexual activity: No    Other Topics Concern    Not on file   Social History Narrative    Not on file        Medication List           Accurate as of 9/24/18  2:19 PM. If you have any questions, ask your nurse or doctor.               CONTINUE taking these medications    aluminum-magnesium hydroxide-simethicone 200-200-20 mg/5 mL Susp  Commonly known as:  MAALOX  Take 30 mLs by mouth 4 (four) times daily before meals and nightly.     amitriptyline 10 MG tablet  Commonly known as:  ELAVIL     amLODIPine 5 MG tablet  Commonly known as:  NORVASC     aspirin 325 MG EC tablet  Commonly known as:  ECOTRIN     azithromycin 250 MG tablet  Commonly known as:  Z-CATIA  Take 1 tablet (250 mg total) by mouth every Mon, Wed, Fri.     CO Q-10 100 mg capsule  Generic drug:  coenzyme Q10     esomeprazole 40 MG capsule  Commonly known as:  NEXIUM     etodolac 400 MG tablet  Commonly known as:  LODINE     FLUAD 5950-8887 (65 YR UP)(PF) 45 mcg (15 mcg x 3)/0.5 mL Syrg  Generic drug:  flu vac-2017 [id number]-cslGH98G(PF)     fluocinonide 0.05% 0.05 % cream  Commonly known as:  LIDEX     ipratropium 0.02 % nebulizer solution  Commonly known as:  ATROVENT  Take 2.5 mLs (500 mcg total) by nebulization 4 (four) times daily.     levothyroxine 88 MCG tablet  Commonly known as:  SYNTHROID     losartan 100 MG tablet  Commonly known as:  COZAAR     montelukast 10 mg tablet  Commonly known as:  SINGULAIR  Take 1 tablet (10 mg total) by mouth once daily.     rosuvastatin 5 MG tablet  Commonly known as:  CRESTOR     VIOS AEROSOL DELIVERY SYSTEM Abby  Generic drug:  nebulizer and compressor          Review of patient's allergies indicates:   Allergen Reactions    Sulfamethoxazole-trimethoprim Hives     Other reaction(s): Hives    Codeine      Can't tolerated oral form of codeine     Review of Systems   Constitution: Negative for fever.   HENT: Positive for hearing loss.    Eyes: Negative for blurred vision.   Cardiovascular: Negative for chest pain and leg swelling.    Respiratory: Negative for shortness of breath.    Endocrine: Negative for polyuria.   Hematologic/Lymphatic: Negative for bleeding problem.   Skin: Negative for rash.   Musculoskeletal: Positive for joint pain and stiffness. Negative for back pain, joint swelling, muscle cramps and muscle weakness.   Gastrointestinal: Negative for melena.   Genitourinary: Negative for hematuria.   Neurological: Negative for loss of balance, numbness and paresthesias.   Psychiatric/Behavioral: Negative for altered mental status.       Objective:   Body mass index is 22.86 kg/m².  Vitals:    09/24/18 1321   BP: (!) 151/65   Pulse: 81       General: Daniel is well-developed, well-nourished, appears stated age, in no acute distress, alert and oriented to time, place and person.       General    Vitals reviewed.  Constitutional: She is oriented to person, place, and time. She appears well-developed and well-nourished. No distress.   HENT:   Head: Atraumatic.   Nose: Nose normal.   Eyes: Pupils are equal, round, and reactive to light. Right eye exhibits no discharge. Left eye exhibits no discharge.   Neck: Normal range of motion.   Cardiovascular: Normal rate and intact distal pulses.    Pulmonary/Chest: Effort normal. No respiratory distress.   Neurological: She is alert and oriented to person, place, and time. She has normal reflexes. She displays normal reflexes. No cranial nerve deficit. Coordination normal.   Psychiatric: She has a normal mood and affect. Her behavior is normal. Judgment and thought content normal.         Right Shoulder Exam     Inspection/Observation   Swelling: absent  Bruising: absent  Scars: absent  Deformity: absent  Scapular Winging: absent  Scapular Dyskinesia: negative  Atrophy: absent    Tenderness   The patient is experiencing no tenderness.    Range of Motion   Active abduction:  90 normal   Passive abduction:  100 normal   Extension:  0 normal   Forward Flexion:  160 normal   Forward Elevation: 160  normal  Adduction: 40 normal  External Rotation 0 degrees:  70 normal   Internal rotation 0 degrees:  T6 normal     Tests & Signs   Drop arm: negative  Renteria test: negative  Impingement: negative  Lift Off Sign: negative  Belly Press: negative  Active Compression Test ([place]'s Sign): negative  Speed's Test: negative  Bear Hug: negative    Other   Sensation: normal    Left Shoulder Exam     Inspection/Observation   Swelling: absent  Bruising: absent  Scars: absent  Deformity: absent  Scapular Winging: absent  Scapular Dyskinesia: negative  Atrophy: absent    Tenderness   The patient is tender to palpation of the biceps tendon and supraspinatus.    Range of Motion   Active abduction:  90 normal   Passive abduction:  100 normal   Extension:  0 normal   Forward Flexion:  160 normal   Forward Elevation: 160 normal  Adduction: 40 normal  External Rotation 0 degrees:  70 normal   Internal rotation 0 degrees:  T10 normal     Tests & Signs   Drop arm: negative  Renteria test: positive  Impingement: positive  Lift Off Sign: negative  Belly Press: negative  Active Compression test ([place]'s Sign): negative  Speed's Test: positive  Bear Hug: negative    Other   Sensation: normal       Muscle Strength   Right Upper Extremity   Shoulder Abduction: 5/5   Shoulder Internal Rotation: 5/5   Shoulder External Rotation: 5/5   Supraspinatus: 5/5/5   Subscapularis: 5/5/5   Biceps: 5/5/5   Left Upper Extremity  Shoulder Abduction: 5/5   Shoulder Internal Rotation: 5/5   Shoulder External Rotation: 4/5   Supraspinatus: 4/5/5   Subscapularis: 5/5/5   Biceps: 5/5/5     Reflexes     Left Side  Biceps:  2+  Triceps:  2+    Right Side   Biceps:  2+  Triceps:  2+    Vascular Exam     Right Pulses      Radial:                    2+      Left Pulses      Radial:                    2+      Capillary Refill  Right Hand: normal capillary refill  Left Hand: normal capillary refill    Salas Shaw MD 9/5/2018       Narrative      EXAMINATION:  MRI SHOULDER WITHOUT CONTRAST LEFT    CLINICAL HISTORY:  Arthritis, shoulder;Shoulder pain, prior xray, bursitis / tenosynovitis suspected;  Primary osteoarthritis, left shoulder    TECHNIQUE:  Multiplanar/multisequence MRI of the left shoulder was performed without the use of intravenous or intra-articular gadolinium.    COMPARISON:  None    FINDINGS:  Rotator cuff: There is moderate tendinosis with a full-thickness tear involving the near full width of the supraspinatus tendon just proximal to the insertion with approximately 9 mm of retraction.  Tear likely involves a few anterior fibers of the infraspinatus tendon.  There is mild-to-moderate tendinosis of the subscapularis without definite tear.  Teres minor tendon is intact.  Rotator cuff muscle bulk is preserved.    Labrum: Global degenerative changes.    Long head of the biceps: There is suggestion of a possible longitudinal split thickness tear of the extra-articular portion of the long head of the biceps tendon without evidence of retraction.  Intra-articular portion appears intact.    Bones: Marrow signal is within normal limits with the exception of mild cystic change underlying the rotator cuff footprint.  No evidence of fracture or marrow replacement process.    AC joint: Lateral downsloping of the acromion noted.    Cartilage: No large glenohumeral articular cartilage defect demonstrated on this non arthrographic study.  There is suggestion of possible mild degenerative marginal spurring along the inferior aspect of the glenohumeral joint.    Miscellaneous: There is a trace glenohumeral joint effusion.  Mild fluid distention of the subacromial/subdeltoid bursa may be related to underlying rotator cuff pathology and/or bursitis.      Impression       1. Large full-thickness tear of the supraspinatus as above.  2. Mild to moderate tendinosis of the subscapularis tendon without definite tear.  3. Possible split thickness tear of the  extra-articular portion of the long head of the biceps tendon without evidence of retraction.  4. Findings suggesting subacromial/subdeltoid bursitis.  5. Lateral downsloping of the acromion noted      Electronically signed by: Salas Shaw MD  Date: 09/05/2018  Time: 16:44            Assessment:     Encounter Diagnoses   Name Primary?    Strain of muscle(s) and tendon(s) of the rotator cuff of left shoulder, initial encounter Yes    Impingement syndrome of left shoulder     Tendinitis of upper biceps tendon of left shoulder         Plan:     We reviewed with Daniel today, the pathology and natural history of her diagnosis. We have discussed a variety of treatment options including medications, physical therapy and other alternative treatments. I also explained the indications, risks and benefits of surgery. After discussion, Daniel decided to proceed with surgery. The decision was made to go forward with    left   a. Shoulder arthroscopic rotator cuff repair   b. Shoulder arthroscopic SAD   d. Shoulder arthroscopic extensive debridement   e. Shoulder possible arthroscopic biceps autotenodesis      The details of the surgical procedure were explained, including the location of probable incisions and a description of likely hardware and/or grafts to be used.  The patient understands the likely convalescence after surgery.  Also, we have thoroughly discussed the risks, benefits and alternatives to surgery, including, but not limited to, the risk of infection, joint stiffness, blood clot (including DVT and/or pulmonary embolus), neurologic and vascular injury.  It was explained that, if tissue has been repaired or reconstructed, there is a chance of failure, which may require further management.      All of the patient's questions were answered and informed consent was obtained. The patient will contact us if they have any questions or concerns in the interim.

## 2018-09-24 NOTE — LETTER
September 24, 2018      José Miguel Yadav MD  [address] [facility] [address] [place] [address]           [facility]  [address] [place] [address]  Phone: [phone]  Fax: [phone]          Patient: Daniel Jones   MR Number: [id number]   YOB: 1940   Date of Visit: 9/24/2018       Dear Dr. José Miguel Yadav:    Thank you for referring Daniel Jones to me for evaluation. Attached you will find relevant portions of my assessment and plan of care.    If you have questions, please do not hesitate to call me. I look forward to following Daniel Jones along with you.    Sincerely,    Cristofer Hall MD    Enclosure  CC:  No Recipients    If you would like to receive this communication electronically, please contact [email or web address] or [phone] to request more information on [employer] access.    For providers and/or their staff who would like to refer a patient to [facility], please contact us through our one-stop-shop provider referral line, [facility], at [phone].    If you feel you have received this communication in error or would no longer like to receive these types of communications, please e-mail [email or web address]

## 2018-09-24 NOTE — PATIENT INSTRUCTIONS
Understanding a Rotator Cuff Tendon Tear    The rotator cuff is a group of 4 muscles and their tendons in the shoulder. The muscles are located in the front, back, and top of the shoulder joint. They each have a strong band of tissue (tendon) that attaches to the top of the upper arm bone. This helps keep the arm bone firmly in place in the socket of the shoulder joint. The muscles and tendons of the rotator cuff also help the shoulder joint with certain movements. These include reaching the arm over the head and rotating the arm.  Any one of the rotator cuff tendons can fray or tear from causes such as injury and overuse. A tear may be partial, with some of the tendon still intact. Or it may be a complete tear, with the tendon fully torn. Both types can cause pain and weakness, and limit arm and shoulder movement. A rotator cuff tear often needs treatment to heal properly.  Causes of a rotator cuff tendon tear  Causes can include:  · Wear and tear of the tendons from aging or normal use over time  · Overuse of the tendons from sports or work activities, especially those that involve repeated overhead movements  · Injury to the tendons from a fall or other accident  Symptoms of a rotator cuff tendon tear  Some people with a rotator cuff tendon tear have few or no symptoms. Others may have symptoms that range from mild to severe. Possible symptoms include:  · Pain in your shoulder, which may be worse with overhead movements or at night from lying on the affected side  · Weakness in your arm and shoulder  · Trouble lifting your arm up or rotating your arm  · Clicking or crackling sounds when moving or using your arm and shoulder  Treating a rotator cuff tendon tear  Treatment for a rotator cuff tendon tear depends on several factors. These include the severity of the tear and your symptoms. Options may include:  · Resting your arm and shoulder. This involves limiting certain movements, such as reaching above your  head or lifting your arm up. These can slow healing and worsen symptoms. You may also need to avoid certain sports and types of work for a time.  · Cold packs or heat packs. These help reduce pain and swelling.  · Prescription or over-the-counter pain medicines. These help reduce pain and swelling.  · Injections of medicine into your shoulder. These help relieve pain and swelling for a time.  · Physical therapy and exercises.  These help improve strength, flexibility, and range of motion in your arm and shoulder.   · Surgery. You may need surgery if your tendon is completely torn or if other treatments dont relieve your symptoms. Different options are available. In many cases, the damaged tendon is repaired. It is then reattached to your arm bone.  Possible complications  · If a partial tear isnt given time to heal, it may get larger or tear completely. You may then need more treatment.  · Even with treatment, a partial or complete tear may sometimes have trouble healing. The problem may become long-term (chronic). This can cause ongoing pain, weakness, and limited movement of your arm and shoulder.     When to call your healthcare provider  Call your healthcare provider right away if you have any of these:  · Fever of 100.4°F (38°C) or higher, or as directed  · Symptoms that dont get better with treatment, or get worse  · After surgery, symptoms at the incision sites such as redness, warmth, swelling, bleeding, or drainage  · New symptoms   Date Last Reviewed: 3/10/2016  © 6859-8741 [employer]. [address] [place] [address]. All rights reserved. This information is not intended as a substitute for professional medical care. Always follow your healthcare professional's instructions.        Rotator Cuff Tear  The rotator cuff is a group of muscles and tendons that surround the shoulder joint. These muscles and tendons hold the arm in its joint. They also help the shoulder to rotate. The  rotator cuff can be torn from overuse or injury. Gradual wear and tear can lead to inflammation of these tendons. This can progress to gradual or sudden tears.  Symptoms of a torn rotator cuff include:  · Shoulder pain that gets worse when you raise your arm overhead  · Weakness of the shoulder muscles with overhead activity  · Popping and clicking when you move your shoulder  · Shoulder pain that wakes you up at night when sleeping on the hurt shoulder  Diagnosis is made by an MRI or arthroscopy. This is a surgical procedure to look inside the joint through a small tube. Partial rotator cuff tears can be treated by first resting, then strengthening the rotator cuff muscles.  Anti-inflammatory medicines, such as ibuprofen or naproxen, are useful. A limited number of steroid injections can be given. Surgery may be recommended for complete tears and partial tears that do not respond to medical treatment.  Home care  · Avoid activities that make your pain worse. This includes overhead activities, doing the same motion over and over, and heavy lifting.  · You may use over-the-counter pain medicines to control pain, unless another medicine was prescribed. If you have chronic liver or kidney disease or ever had a stomach ulcer or GI bleeding, talk with your healthcare provider before using these medicines.  · If you were given a sling, use it for comfort. After your pain decreases, dont keep your arm in the sling all the time. Take your arm out several times a day and move the shoulder joint, as you are able.  · Your healthcare provider may recommend gentle pendulum exercises. Stand or sit with your arm vertical and close to your side. Relax your shoulder muscles and gently swing the arm forward and back, side to side, and in small circles for about 5 minutes. Do this once or twice a day. There should be only slight pain with this exercise.  · You may benefit from physical therapy or a home exercise program to strengthen  your shoulder muscles. This will also increase your pain-free range of motion. Applying heat prior to exercises can help prepare the muscles and joint for activity. Talk to your healthcare provider about what is best for your condition.  Follow-up care  Follow up with your healthcare provider, or as advised.  When to seek medical advice  Call your healthcare provider right away if any of the following occur:  · Increasing shoulder pain  · Rapid swelling in the involved shoulder or arm  · Numbness, tingling, or pain radiating down the arm to the hand  · Loss of strength in the affected arm  Date Last Reviewed: 11/23/2015  © 9945-7082 [employer]. [address] [place] [address]. All rights reserved. This information is not intended as a substitute for professional medical care. Always follow your healthcare professional's instructions.        Shoulder Arthroscopy: Conditions Treated  You will be given instructions for how to prepare for your surgery and when you should arrive at the hospital or surgery center. Just before surgery, a doctor will talk to you about the anesthesia that will be used to keep you free of pain during surgery. You may be asked by several people to confirm which shoulder is being operated on. This is for your safety. Below are common shoulder problems and how they are treated during arthroscopy.       Impingement  · Repeated overhead movements can inflame your rotator cuff and bursa. A bone spur may also form. This causes pain and problems with certain arm movements. Impingement is also called bursitis or tendinitis.  · During surgery, an inflamed bursa may be removed. Bone may be trimmed. And the coracoacromial ligament may be detached. These steps make more room, relieving pressure and allowing the arm to move more freely.    Torn rotator cuff  · A rotator cuff can tear due to a sudden injury or from overuse. This can cause pain, weakness, and loss of normal shoulder  movement.  · During surgery, torn rotator cuff tendons may be trimmed. The tendons are then reattached to the humerus. This is done with stitches, anchors, or surgical tacks.    Stretched capsule  · A stretched capsule will remain loose. A loose capsule cant hold the joint firmly in place. The bones of the joint may feel like they move too much and the shoulder can dislocate.  · During arthroscopy, a stretched capsule is folded over itself and sutured in place. (This is done from inside the capsule.) This tightens the capsule, helping make the shoulder joint more stable.    Torn labrum  · The shoulder is a ball and socket joint.  The labrum normally supports and cushions the shoulder joint. In the case of a torn labrum, the socket that the ball (the upper end of your arm) fits into is not very deep. The shallow socket increases the  potential for instability.  · The labrum may tear off the rim of the glenoid. This can cause the joint to catch or feel like its slipping out of place. The shoulder may even dislocate.  · A torn labrum is repaired by reattaching it to the glenoid. This is often done with special anchors put into the glenoid bone. Sutures attached to the anchors are tied to hold the labrum in place. The joint then feels more stable.       Arthritis and loose bodies  · Arthritis is damage to joint cartilage with age and use. Injury or disease can also cause it. Wear and tear may also lead to loose bodies (pieces of bone or cartilage) or bone spurs in a joint.  · During surgery, bone spurs are removed. Rough parts of the joint are smoothed. Any loose body is removed from the joint. Bone may also be scraped or shaved to promote new cartilage growth.  Date Last Reviewed: 8/31/2015  © 9746-6488 [employer]. [address] [place] [address], [place] [address]. All rights reserved. This information is not intended as a substitute for professional medical care. Always follow your healthcare professional's  instructions.

## 2018-09-24 NOTE — PROGRESS NOTES
Subjective:     Patient ID: Daniel Jones is a 78 y.o. female.    Chief Complaint: Pain of the Left Shoulder    Patient is here for left shoulder pain. Reports it has been bothering her for 1 month. Reports no CARTER. Reports no previous injury.      Shoulder Pain    The pain is present in the left shoulder. This is a new problem. The current episode started more than 1 month ago. The problem occurs intermittently. The problem has been gradually worsening. The quality of the pain is described as sharp. The pain is at a severity of 3/10. Associated symptoms include stiffness. Pertinent negatives include no fever or numbness. The symptoms are aggravated by activity. She has tried rest and OTC pain meds for the symptoms. The treatment provided significant relief. Physical therapy was not tried.      Past Medical History:   Diagnosis Date    Arthritis     Hypertension     Thyroid disease      Past Surgical History:   Procedure Laterality Date    tonsilectomy N/A      Family History   Problem Relation Age of Onset    Cancer Mother     Heart failure Father     Hypertension Father     Diabetes Father     Diabetes Maternal Grandmother     Diabetes Paternal Grandmother      Social History     Socioeconomic History    Marital status:      Spouse name: Not on file    Number of children: Not on file    Years of education: Not on file    Highest education level: Not on file   Social Needs    Financial resource strain: Not on file    Food insecurity - worry: Not on file    Food insecurity - inability: Not on file    Transportation needs - medical: Not on file    Transportation needs - non-medical: Not on file   Occupational History    Not on file   Tobacco Use    Smoking status: Former Smoker     Types: Cigarettes     Last attempt to quit: 1970     Years since quittin.7    Smokeless tobacco: Never Used   Substance and Sexual Activity    Alcohol use: No    Drug use: No    Sexual activity: No    Other Topics Concern    Not on file   Social History Narrative    Not on file        Medication List           Accurate as of 9/24/18  2:19 PM. If you have any questions, ask your nurse or doctor.               CONTINUE taking these medications    aluminum-magnesium hydroxide-simethicone 200-200-20 mg/5 mL Susp  Commonly known as:  MAALOX  Take 30 mLs by mouth 4 (four) times daily before meals and nightly.     amitriptyline 10 MG tablet  Commonly known as:  ELAVIL     amLODIPine 5 MG tablet  Commonly known as:  NORVASC     aspirin 325 MG EC tablet  Commonly known as:  ECOTRIN     azithromycin 250 MG tablet  Commonly known as:  Z-CATIA  Take 1 tablet (250 mg total) by mouth every Mon, Wed, Fri.     CO Q-10 100 mg capsule  Generic drug:  coenzyme Q10     esomeprazole 40 MG capsule  Commonly known as:  NEXIUM     etodolac 400 MG tablet  Commonly known as:  LODINE     FLUAD 1341-7214 (65 YR UP)(PF) 45 mcg (15 mcg x 3)/0.5 mL Syrg  Generic drug:  flu vac-2017 [id number](PF)     fluocinonide 0.05% 0.05 % cream  Commonly known as:  LIDEX     ipratropium 0.02 % nebulizer solution  Commonly known as:  ATROVENT  Take 2.5 mLs (500 mcg total) by nebulization 4 (four) times daily.     levothyroxine 88 MCG tablet  Commonly known as:  SYNTHROID     losartan 100 MG tablet  Commonly known as:  COZAAR     montelukast 10 mg tablet  Commonly known as:  SINGULAIR  Take 1 tablet (10 mg total) by mouth once daily.     rosuvastatin 5 MG tablet  Commonly known as:  CRESTOR     VIOS AEROSOL DELIVERY SYSTEM Abby  Generic drug:  nebulizer and compressor          Review of patient's allergies indicates:   Allergen Reactions    Sulfamethoxazole-trimethoprim Hives     Other reaction(s): Hives    Codeine      Can't tolerated oral form of codeine     Review of Systems   Constitution: Negative for fever.   HENT: Positive for hearing loss.    Eyes: Negative for blurred vision.   Cardiovascular: Negative for chest pain and leg swelling.    Respiratory: Negative for shortness of breath.    Endocrine: Negative for polyuria.   Hematologic/Lymphatic: Negative for bleeding problem.   Skin: Negative for rash.   Musculoskeletal: Positive for joint pain and stiffness. Negative for back pain, joint swelling, muscle cramps and muscle weakness.   Gastrointestinal: Negative for melena.   Genitourinary: Negative for hematuria.   Neurological: Negative for loss of balance, numbness and paresthesias.   Psychiatric/Behavioral: Negative for altered mental status.       Objective:   Body mass index is 22.86 kg/m².  Vitals:    09/24/18 1321   BP: (!) 151/65   Pulse: 81       General: Daniel is well-developed, well-nourished, appears stated age, in no acute distress, alert and oriented to time, place and person.       General    Vitals reviewed.  Constitutional: She is oriented to person, place, and time. She appears well-developed and well-nourished. No distress.   HENT:   Head: Atraumatic.   Nose: Nose normal.   Eyes: Pupils are equal, round, and reactive to light. Right eye exhibits no discharge. Left eye exhibits no discharge.   Neck: Normal range of motion.   Cardiovascular: Normal rate and intact distal pulses.    Pulmonary/Chest: Effort normal. No respiratory distress.   Neurological: She is alert and oriented to person, place, and time. She has normal reflexes. She displays normal reflexes. No cranial nerve deficit. Coordination normal.   Psychiatric: She has a normal mood and affect. Her behavior is normal. Judgment and thought content normal.         Right Shoulder Exam     Inspection/Observation   Swelling: absent  Bruising: absent  Scars: absent  Deformity: absent  Scapular Winging: absent  Scapular Dyskinesia: negative  Atrophy: absent    Tenderness   The patient is experiencing no tenderness.    Range of Motion   Active abduction:  90 normal   Passive abduction:  100 normal   Extension:  0 normal   Forward Flexion:  160 normal   Forward Elevation: 160  normal  Adduction: 40 normal  External Rotation 0 degrees:  70 normal   Internal rotation 0 degrees:  T6 normal     Tests & Signs   Drop arm: negative  Renteria test: negative  Impingement: negative  Lift Off Sign: negative  Belly Press: negative  Active Compression Test ([place]'s Sign): negative  Speed's Test: negative  Bear Hug: negative    Other   Sensation: normal    Left Shoulder Exam     Inspection/Observation   Swelling: absent  Bruising: absent  Scars: absent  Deformity: absent  Scapular Winging: absent  Scapular Dyskinesia: negative  Atrophy: absent    Tenderness   The patient is tender to palpation of the biceps tendon and supraspinatus.    Range of Motion   Active abduction:  90 normal   Passive abduction:  100 normal   Extension:  0 normal   Forward Flexion:  160 normal   Forward Elevation: 160 normal  Adduction: 40 normal  External Rotation 0 degrees:  70 normal   Internal rotation 0 degrees:  T10 normal     Tests & Signs   Drop arm: negative  Renteria test: positive  Impingement: positive  Lift Off Sign: negative  Belly Press: negative  Active Compression test ([place]'s Sign): negative  Speed's Test: positive  Bear Hug: negative    Other   Sensation: normal       Muscle Strength   Right Upper Extremity   Shoulder Abduction: 5/5   Shoulder Internal Rotation: 5/5   Shoulder External Rotation: 5/5   Supraspinatus: 5/5/5   Subscapularis: 5/5/5   Biceps: 5/5/5   Left Upper Extremity  Shoulder Abduction: 5/5   Shoulder Internal Rotation: 5/5   Shoulder External Rotation: 4/5   Supraspinatus: 4/5/5   Subscapularis: 5/5/5   Biceps: 5/5/5     Reflexes     Left Side  Biceps:  2+  Triceps:  2+    Right Side   Biceps:  2+  Triceps:  2+    Vascular Exam     Right Pulses      Radial:                    2+      Left Pulses      Radial:                    2+      Capillary Refill  Right Hand: normal capillary refill  Left Hand: normal capillary refill    Salas Shaw MD 9/5/2018       Narrative      EXAMINATION:  MRI SHOULDER WITHOUT CONTRAST LEFT    CLINICAL HISTORY:  Arthritis, shoulder;Shoulder pain, prior xray, bursitis / tenosynovitis suspected;  Primary osteoarthritis, left shoulder    TECHNIQUE:  Multiplanar/multisequence MRI of the left shoulder was performed without the use of intravenous or intra-articular gadolinium.    COMPARISON:  None    FINDINGS:  Rotator cuff: There is moderate tendinosis with a full-thickness tear involving the near full width of the supraspinatus tendon just proximal to the insertion with approximately 9 mm of retraction.  Tear likely involves a few anterior fibers of the infraspinatus tendon.  There is mild-to-moderate tendinosis of the subscapularis without definite tear.  Teres minor tendon is intact.  Rotator cuff muscle bulk is preserved.    Labrum: Global degenerative changes.    Long head of the biceps: There is suggestion of a possible longitudinal split thickness tear of the extra-articular portion of the long head of the biceps tendon without evidence of retraction.  Intra-articular portion appears intact.    Bones: Marrow signal is within normal limits with the exception of mild cystic change underlying the rotator cuff footprint.  No evidence of fracture or marrow replacement process.    AC joint: Lateral downsloping of the acromion noted.    Cartilage: No large glenohumeral articular cartilage defect demonstrated on this non arthrographic study.  There is suggestion of possible mild degenerative marginal spurring along the inferior aspect of the glenohumeral joint.    Miscellaneous: There is a trace glenohumeral joint effusion.  Mild fluid distention of the subacromial/subdeltoid bursa may be related to underlying rotator cuff pathology and/or bursitis.      Impression       1. Large full-thickness tear of the supraspinatus as above.  2. Mild to moderate tendinosis of the subscapularis tendon without definite tear.  3. Possible split thickness tear of the  extra-articular portion of the long head of the biceps tendon without evidence of retraction.  4. Findings suggesting subacromial/subdeltoid bursitis.  5. Lateral downsloping of the acromion noted      Electronically signed by: Salas Shaw MD  Date: 09/05/2018  Time: 16:44            Assessment:     Encounter Diagnoses   Name Primary?    Strain of muscle(s) and tendon(s) of the rotator cuff of left shoulder, initial encounter Yes    Impingement syndrome of left shoulder     Tendinitis of upper biceps tendon of left shoulder         Plan:     We reviewed with Daniel today, the pathology and natural history of her diagnosis. We have discussed a variety of treatment options including medications, physical therapy and other alternative treatments. I also explained the indications, risks and benefits of surgery. After discussion, Daniel decided to proceed with surgery. The decision was made to go forward with    left   a. Shoulder arthroscopic rotator cuff repair   b. Shoulder arthroscopic SAD   d. Shoulder arthroscopic extensive debridement   e. Shoulder possible arthroscopic biceps autotenodesis      The details of the surgical procedure were explained, including the location of probable incisions and a description of likely hardware and/or grafts to be used.  The patient understands the likely convalescence after surgery.  Also, we have thoroughly discussed the risks, benefits and alternatives to surgery, including, but not limited to, the risk of infection, joint stiffness, blood clot (including DVT and/or pulmonary embolus), neurologic and vascular injury.  It was explained that, if tissue has been repaired or reconstructed, there is a chance of failure, which may require further management.      All of the patient's questions were answered and informed consent was obtained. The patient will contact us if they have any questions or concerns in the interim.

## 2018-09-27 ENCOUNTER — TELEPHONE (OUTPATIENT)
Dept: ORTHOPEDICS | Facility: CLINIC | Age: 78
End: 2018-09-27

## 2018-09-27 NOTE — TELEPHONE ENCOUNTER
----- Message from Feliz Arreguin sent at 9/27/2018 10:09 AM CDT -----  Contact: Farhad-Pt's   Marianne:    Have you contacted the patient to move her surgery up and the  has not gotten informed yet? Her pre-op appointments have not been updated yet as well. Thanks.  ----- Message -----  From: Cristofer Hall MD  Sent: 9/27/2018  10:06 AM  To: Marianne Benedict LPN, Feliz Arreguin    Speak to Marianne, I dont recall which date we moved her up to. I believe maybe Oct 11th? But I am not sure. I dont see any of the cases we booked yesterday on yet    ----- Message -----  From: Feliz Arreguin  Sent: 9/27/2018   9:47 AM  To: MD Dr. Rafael Brock please advise. Would you like to move her up? I see next week is pretty busy.      ----- Message -----  From: Matt Perez  Sent: 9/27/2018   9:39 AM  To: Rafael Cleveland Staff    He is calling stating that the pt is currently in severe pain and would like to have his wife's surgery moved up earlier if possible, please advise  [phone] (home)

## 2018-09-27 NOTE — TELEPHONE ENCOUNTER
Pt's surgery is now scheduled for 10/18.   Preop appts moved up.   Pt verbalized understanding and OR notified.

## 2018-09-28 ENCOUNTER — TELEPHONE (OUTPATIENT)
Dept: PULMONOLOGY | Facility: CLINIC | Age: 78
End: 2018-09-28

## 2018-09-28 NOTE — TELEPHONE ENCOUNTER
----- Message from Shameka Negrete LPN sent at 9/24/2018  4:39 PM CDT -----  Regarding: Preop clearance  Patient is scheduled for left rotator cuff repair on November 8 with Dr. Hall and will need pre-op  clearance.  Does she need to be seen again since she was seen recently by Dr. Yadav.  Please advise.  Thanks,    Shameka Negrete LPN

## 2018-10-01 ENCOUNTER — CLINICAL SUPPORT (OUTPATIENT)
Dept: CARDIOLOGY | Facility: CLINIC | Age: 78
End: 2018-10-01
Payer: MEDICARE

## 2018-10-01 ENCOUNTER — HOSPITAL ENCOUNTER (OUTPATIENT)
Dept: RADIOLOGY | Facility: HOSPITAL | Age: 78
Discharge: HOME | End: 2018-10-01
Attending: ORTHOPAEDIC SURGERY
Payer: MEDICARE

## 2018-10-01 DIAGNOSIS — Z01.818 PREOP TESTING: ICD-10-CM

## 2018-10-01 PROCEDURE — 93010 ELECTROCARDIOGRAM REPORT: CPT | Mod: S$PBB,,, | Performed by: NUCLEAR MEDICINE

## 2018-10-01 PROCEDURE — 71046 X-RAY EXAM CHEST 2 VIEWS: CPT | Mod: TC,FY,PO

## 2018-10-01 PROCEDURE — 71046 X-RAY EXAM CHEST 2 VIEWS: CPT | Mod: 26,,, | Performed by: RADIOLOGY

## 2018-10-01 PROCEDURE — 93005 ELECTROCARDIOGRAM TRACING: CPT | Mod: PBBFAC,PO | Performed by: NUCLEAR MEDICINE

## 2018-10-01 NOTE — TELEPHONE ENCOUNTER
Called patient   OK to have surgery from pulmonary standpoint  Advised patient to see cardiologist ( cardiologist not in [facility] system)  Cleared for surgery from pulmonary standpoint  LAST Pulmonary Function Studies:  Normal spirometry, normal lung volumes, normal diffusion capacity, normal study.         Subjective:       Patient ID: Daniel Jones is a 78 y.o. female.    Chief Complaint: She       COPD and MAIC (mycobacterium avium-intracellulare complex)    HPI     Mycobacterium Avium Intracellularie:  On zithro 3x a week  Unable to tolerate rifampin and ethambutol  Jet nebs - Atrovent BID   C/O of left sided shoulder pain     Chronic Obstructive Pulmonary Disease / Bronchiectesis  Bronchiectasis  She presents for evaluation and treatment of bronchiectasis. The patient was diagnosed with bronchiectasis on 8/1/2017 on the basis of a CT scan. The bronchiectasis was localized to the lingula and localized to the right middle lobe. The patient has a history of atypical mycobacterium infection, first diagnosed 8/2017 at [facility] . Prior testing has included pulmonary function testing and sputum studies for fungus and mycobacteria which revealed evidence of bronchiectesis. The patient is having constitutional symptoms, including weight loss. There evidence of MAIC history of pneumonia, and the patient has not been hospitalized for this condition before. She has a history of 5 pack years. The patient has no exercise limitations. Prior treatments have included antibiotics for MAIC, which have provided no relief of symptoms.      Chest Pain:  Daniel Jones complains of chest - left shoulder discomfort. Onset was 6 months ago. Symptoms have worsened since that time. The patient's pain is intermittent. The patient describes the pain as achiness and does not radiate. Patient rates pain as a  6/10 in intensity. Associated symptoms are: none. Aggravating factors are: none. Alleviating  factors are: none. Patient's cardiac risk factors are: advanced age (older than 55 for men, 65 for women). Patient's risk factors for DVT/PE: none. Previous cardiac testing: none.        Past Medical History:   Diagnosis Date    Arthritis     Hypertension     Thyroid disease      Past Surgical History:   Procedure Laterality Date    tonsilectomy N/A      Social History     Socioeconomic History    Marital status:      Spouse name: Not on file    Number of children: Not on file    Years of education: Not on file    Highest education level: Not on file   Social Needs    Financial resource strain: Not on file    Food insecurity - worry: Not on file    Food insecurity - inability: Not on file    Transportation needs - medical: Not on file    Transportation needs - non-medical: Not on file   Occupational History    Not on file   Tobacco Use    Smoking status: Former Smoker     Types: Cigarettes     Last attempt to quit: 1970     Years since quittin.7    Smokeless tobacco: Never Used   Substance and Sexual Activity    Alcohol use: No    Drug use: No    Sexual activity: No   Other Topics Concern    Not on file   Social History Narrative    Not on file     Review of Systems   Constitutional: Positive for fatigue. Negative for fever.   HENT: Positive for rhinorrhea and congestion. Negative for postnasal drip.    Eyes: Negative for redness and itching.   Respiratory: Positive for cough, sputum production, shortness of breath, dyspnea on extertion and use of rescue inhaler. Negative for Paroxysmal Nocturnal Dyspnea.    Cardiovascular: Negative for chest pain, palpitations and leg swelling.   Genitourinary: Negative for difficulty urinating and hematuria.   Endocrine: Negative for cold intolerance and heat intolerance.    Musculoskeletal: Positive for arthralgias.   Skin: Negative for rash.   Gastrointestinal: Negative for nausea and abdominal pain.   Neurological: Negative for dizziness,  syncope, weakness and light-headedness.   Hematological: Negative for adenopathy. Does not bruise/bleed easily.   Psychiatric/Behavioral: Negative for sleep disturbance. The patient is not nervous/anxious.        Objective:      Physical Exam   Constitutional: She is oriented to person, place, and time. She appears well-developed and well-nourished.   HENT:   Head: Normocephalic and atraumatic.   Mouth/Throat: Oropharyngeal exudate present.   Eyes: Conjunctivae are normal. Pupils are equal, round, and reactive to light.   Neck: Neck supple. No JVD present. No tracheal deviation present. No thyromegaly present.   Cardiovascular: Normal rate, regular rhythm and normal heart sounds.   Pulmonary/Chest: Effort normal. No respiratory distress. She has decreased breath sounds. She has no wheezes. She has rales in the right lower field and the left lower field. She exhibits no tenderness.   Abdominal: Soft. Bowel sounds are normal.   Musculoskeletal: She exhibits no edema.   Decreased range of motion of  Left shoulder.     Lymphadenopathy:     She has no cervical adenopathy.   Neurological: She is alert and oriented to person, place, and time.   Skin: Skin is warm and dry.   Nursing note and vitals reviewed.    Personal Diagnostic Review  Chest x-ray: bilateral bronchiectesis  Office Spirometry Results:     No flowsheet data found.  Pulmonary Studies Review 9/4/2018   SpO2 97   Height 62.000   Weight -   BMI (Calculated) -   Predicted Distance 434.92   Predicted Distance Meters (Calculated) -         Assessment:       1. Arthritis of left shoulder region    2. Bronchiectasis without complication    3. Chronic obstructive pulmonary disease, unspecified COPD type    4. MAIC (mycobacterium avium-intracellulare complex)        Outpatient Encounter Medications as of 9/4/2018   Medication Sig Dispense Refill    amitriptyline (ELAVIL) 10 MG tablet Take 10 mg by mouth nightly.      amLODIPine (NORVASC) 5 MG tablet Take 1 tablet  by mouth once daily.      aspirin (ECOTRIN) 325 MG EC tablet Take 325 mg by mouth nightly.      azithromycin (Z-CATIA) 250 MG tablet Take 1 tablet (250 mg total) by mouth every Mon, Wed, Fri. 12 tablet 11    coenzyme Q10 (CO Q-10) 100 mg capsule Take 100 mg by mouth once daily.      esomeprazole (NEXIUM) 40 MG capsule Take 40 mg by mouth before breakfast.      etodolac (LODINE) 400 MG tablet Take 1 tablet by mouth once daily.      flu vac-2017 [id number],PF, (FLUAD 4477-9108, 65 YR UP,,PF,) 45 mcg (15 mcg x 3)/0.5 mL Syrg 0.5 mLs.      fluocinonide 0.05% (LIDEX) 0.05 % cream LEODAN 1 APPLICATION ON THE SKIN BID      ipratropium (ATROVENT) 0.02 % nebulizer solution Take 2.5 mLs (500 mcg total) by nebulization 4 (four) times daily. 900 mL 3    levothyroxine (SYNTHROID) 88 MCG tablet Take 88 mcg by mouth once daily.      losartan (COZAAR) 100 MG tablet Take 100 mg by mouth once daily.      montelukast (SINGULAIR) 10 mg tablet Take 1 tablet (10 mg total) by mouth once daily. 90 tablet 4    rosuvastatin (CRESTOR) 5 MG tablet Take 5 mg by mouth once daily.      VIOS AEROSOL DELIVERY SYSTEM Abby USE 1 VIAL NEBULIZED Q 6 H WHILE AWAKE  0    aluminum-magnesium hydroxide-simethicone (MAALOX) 200-200-20 mg/5 mL Susp Take 30 mLs by mouth 4 (four) times daily before meals and nightly. 354 mL prn     No facility-administered encounter medications on file as of 9/4/2018.      Orders Placed This Encounter   Procedures    MRI Shoulder Without Contrast Left     Standing Status:   Future     Standing Expiration Date:   9/4/2019     Order Specific Question:   Does the patient have a pacemaker or a defibrilator?     Answer:   No     Order Specific Question:   Does the patient have a cerebral aneurysm or surgical clip, pump, nerve or brain stimulator, middle or inner ear prosthesis, or other metal implant or  been injured by a metal object(i.e. bullet, bb, shrapnel)?     Answer:   No     Order Specific Question:   Is the  patient claustrophobic?     Answer:   No     Order Specific Question:   Will the patient require sedation?     Answer:   No     Order Specific Question:   Does the patient have any of the following conditions? Diabetes, History of Renal Disease or Hypertension requiring medical therapy?     Answer:   Yes     Order Specific Question:   May the Radiologist modify the order per protocol to meet the clinical needs of the patient?     Answer:   Yes     Order Specific Question:   Is this part of a Research Study?     Answer:   No     Order Specific Question:   Recist criteria?     Answer:   No     Order Specific Question:   Will this service be billed to a Worker's Comp policy?     Answer:   No     Order Specific Question:   Does the patient have on a skin patch for medication with aluminized backing?     Answer:   No    X-Ray Chest PA And Lateral     Standing Status:   Future     Standing Expiration Date:   3/4/2020     Order Specific Question:   Reason for Exam:     Answer:   SOB    Spirometry with/without bronchodilator     Standing Status:   Future     Standing Expiration Date:   9/4/2019     Plan:       Requested Prescriptions      No prescriptions requested or ordered in this encounter     Arthritis of left shoulder region  -     MRI Shoulder Without Contrast Left; Future; Expected date: 09/04/2018    Bronchiectasis without complication  -     Spirometry with/without bronchodilator; Future; Expected date: 03/07/2019  -     X-Ray Chest PA And Lateral; Future; Expected date: 09/04/2018    Chronic obstructive pulmonary disease, unspecified COPD type    MAIC (mycobacterium avium-intracellulare complex)    Clearance for surgery:  The patient is at an increased risk of complications from surgery due to multiple medical problems including COPD. Vaccination status reviewed and updated. Risks of possible complications of surgery discussed in detail including risk of respiratory failure requiring mechanical ventilation,  post operative pneumonia, deep venous thrombosis, pulmonary embolism and death.  Pulmonary function studies, arterial blood gases and chest X ray reports are independently reviewed. Methods of improving lung function prior to surgery including incentive spirometry, regular use of bronchodilators, exercise and respiratory toilet discussed. Patient voices understanding of increased risks involved due to compromised pulmonary status and the need to comply with treatment plan prior to surgery.  The patient is cleared for anesthesia and surgery from a pulmonary standpoint.         Follow-up in about 7 months (around 4/1/2019) for Review trenton and CXR.    MEDICAL DECISION MAKING: Moderate to high complexity.  Overall, the multiple problems listed are of moderate to high severity that may impact quality of life and activities of daily living. Side effects of medications, treatment plan as well as options and alternatives reviewed and discussed with patient. There was counseling of patient concerning these issues.    Total time spent in face to face counseling and coordination of care - 25  minutes over 50% of time was used in discussion of prognosis, risks, benefits of treatment, instructions and compliance with regimen . Discussion with other physicians or health care providers (DME, NP, pharmacy, respiratory therapy) occurred.

## 2018-10-03 ENCOUNTER — TELEPHONE (OUTPATIENT)
Dept: ORTHOPEDICS | Facility: CLINIC | Age: 78
End: 2018-10-03

## 2018-10-03 NOTE — TELEPHONE ENCOUNTER
Left a message for the patient to give the office a call back.FP        ----- Message from Marianne Benedict LPN sent at 10/3/2018  2:30 PM CDT -----  Contact: Patient  Can you call pt and discuss with Dr. Hall since you are with him today.  ----- Message -----  From: Suzanna Badillo MA  Sent: 10/3/2018   1:55 PM  To: Marianne Benedict LPN        ----- Message -----  From: Veronica Eisenberg  Sent: 10/3/2018   1:52 PM  To: [facility] Staff    Patient states she has a large bruise on the area that she is suppose to have suregery on, patient would like to know if the will effect anything. Please call her back at [phone]. Thank you

## 2018-10-04 ENCOUNTER — TELEPHONE (OUTPATIENT)
Dept: ORTHOPEDICS | Facility: CLINIC | Age: 78
End: 2018-10-04

## 2018-10-04 NOTE — TELEPHONE ENCOUNTER
I reached out to this patient and her , She stated that she was concerned about the bruising. She states that the pain has calmed down its not that bad.. I informed patient that she can come to the office to let Namita OLSEN to see her if its after hours she can go to the emergency room.. Patient refuse the office visit for today but she will call back if she start feeling uncomfortable again...                  ----- Message from Cristofer Hall MD sent at 10/3/2018  5:21 PM CDT -----  Contact: pt  Bruising can be normal from tear. If she feels it is an emergency go to the ER. Otherwise have her come see Sarahy tomorrow  ----- Message -----  From: Mady Andre MA  Sent: 10/3/2018   4:52 PM  To: MD Dr. Rafael Brock,    This patient called to say her arm is turning black and it hurt really bad. Lots of swelling her  says also.. Please advise

## 2018-10-10 ENCOUNTER — TELEPHONE (OUTPATIENT)
Dept: ORTHOPEDICS | Facility: CLINIC | Age: 78
End: 2018-10-10

## 2018-10-10 NOTE — TELEPHONE ENCOUNTER
Spoke with patient's  informed him that we typically don't put in for physical therapy until after the surgery. Informed him that Dr. Hall will inform them when he wants her to start the therapy and will be discussed on her 2 week post op appointment. Informed him the hospital would contact them 24-48 hours prior to her surgery to give them instructions and arrival time. Patient's  verbalized his understanding and was thankful for the call back.    ----- Message from Lacey Hernandez sent at 10/10/2018 10:11 AM CDT -----  Contact: pt   Call caller regarding getting Physical Therapy. Pt states that she need orders in cause she was told to schedule therapy the day after her surgery and when she call to schedule she was told she need orders.   [phone] (home)

## 2018-10-11 ENCOUNTER — TELEPHONE (OUTPATIENT)
Dept: ORTHOPEDICS | Facility: CLINIC | Age: 78
End: 2018-10-11

## 2018-10-17 ENCOUNTER — ANESTHESIA EVENT (OUTPATIENT)
Dept: SURGERY | Facility: HOSPITAL | Age: 78
End: 2018-10-17
Payer: MEDICARE

## 2018-10-17 NOTE — PRE ADMISSION SCREENING
Pre op instructions reviewed with patient per phone:    To confirm, Your surgeon has instructed you:  Surgery is scheduled 10/18/2018 at 0800 am.      Please report to [facility] 1st floor main lobby by 6:30 am.   Pre admit office to call this afternoon only if your surgery time changes.        INSTRUCTIONS IMPORTANT!!!  ¨ Do not eat, drink, or smoke after 12 midnight-including water. OK to brush teeth, no gum, candy or mints!    ¨ Take only these medicines with a small swallow of water-morning of surgery.  Nexium, amlodipine  ____  Do not wear makeup, including mascara.  ____  No powder, lotions or creams to surgical area.  ____  Please remove all jewelry, including piercings and leave at home.  ____  No money or valuables needed. Please leave at home.  ____  Please bring identification and insurance information to hospital.  ____  If going home the same day, arrange for a ride home. You will not be able to   drive if Anesthesia was used.  ____  Children, under 12 years old, must remain in the waiting room with an adult.  They are not allowed in patient areas.  ____  Wear loose fitting clothing. Allow for dressings, bandages.  ____  Stop Aspirin, Ibuprofen, Motrin and Aleve at least 5-7 days before surgery, unless otherwise instructed by your doctor, or the nurse.   You MAY use Tylenol/acetaminophen until day of surgery.  ____  If you take diabetic medication, do not take am of surgery unless instructed by   Doctor.  ____ Stop taking any Fish Oil supplement or any Vitamins that contain Vitamin E at least 5 days prior to surgery.          Bathing Instructions-- The night before surgery and the morning prior to coming to the hospital:   -Do not shave the surgical area.   -Shower and wash your hair and body as usual with anti-bacterial  soap and shampoo.   -Rinse your hair and body completely.   -Use one packet of hibiclens to wash the surgical site (using your hand) gently for 5 minutes.  Do  not scrub you skin too hard.   -Do not use hibiclens on your head, face, or genitals.   -Do not wash with anti-bacterial soap after you use the hibiclens.   -Rinse your body thoroughly.   -Dry with clean, soft towel.  Do not use lotion, cream, deodorant, or powders on   the surgical site.    Use antibacterial soap in place of hibiclens if your surgery is on the head, face or genitals.         Surgical Site Infection    Prevention of surgical site infections:     -Keep incisions clean and dry.   -Do not soak/submerge incisions in water until completely healed.   -Do not apply lotions, powders, creams, or deodorants to site.   -Always make sure hands are cleaned with antibacterial soap/ alcohol-based   prior to touching the surgical site.  (This includes doctors, nurses, staff, and yourself.)    Signs and symptoms:   -Redness and pain around the area where you had surgery   -Drainage of cloudy fluid from your surgical wound   -Fever over 100.4  I have read or had read and explained to me, and understand the above information.

## 2018-10-18 ENCOUNTER — ANESTHESIA (OUTPATIENT)
Dept: SURGERY | Facility: HOSPITAL | Age: 78
End: 2018-10-18
Payer: MEDICARE

## 2018-10-18 ENCOUNTER — HOSPITAL ENCOUNTER (OUTPATIENT)
Facility: HOSPITAL | Age: 78
Discharge: HOME | End: 2018-10-18
Attending: ORTHOPAEDIC SURGERY | Admitting: ORTHOPAEDIC SURGERY
Payer: MEDICARE

## 2018-10-18 DIAGNOSIS — M75.42 ROTATOR CUFF IMPINGEMENT SYNDROME OF LEFT SHOULDER: Primary | ICD-10-CM

## 2018-10-18 DIAGNOSIS — M75.122 COMPLETE TEAR OF LEFT ROTATOR CUFF: ICD-10-CM

## 2018-10-18 PROCEDURE — 25000003 PHARM REV CODE 250: Performed by: NURSE ANESTHETIST, CERTIFIED REGISTERED

## 2018-10-18 PROCEDURE — 27201423 OPTIME MED/SURG SUP & DEVICES STERILE SUPPLY: Performed by: ORTHOPAEDIC SURGERY

## 2018-10-18 PROCEDURE — 25000003 PHARM REV CODE 250: Performed by: STUDENT IN AN ORGANIZED HEALTH CARE EDUCATION/TRAINING PROGRAM

## 2018-10-18 PROCEDURE — 29826 SHO ARTHRS SRG DECOMPRESSION: CPT | Mod: LT,,, | Performed by: ORTHOPAEDIC SURGERY

## 2018-10-18 PROCEDURE — 63600175 PHARM REV CODE 636 W HCPCS: Performed by: NURSE ANESTHETIST, CERTIFIED REGISTERED

## 2018-10-18 PROCEDURE — 37000008 HC ANESTHESIA 1ST 15 MINUTES: Performed by: ORTHOPAEDIC SURGERY

## 2018-10-18 PROCEDURE — 63600175 PHARM REV CODE 636 W HCPCS: Performed by: STUDENT IN AN ORGANIZED HEALTH CARE EDUCATION/TRAINING PROGRAM

## 2018-10-18 PROCEDURE — 29827 SHO ARTHRS SRG RT8TR CUF RPR: CPT | Mod: 22,LT,, | Performed by: ORTHOPAEDIC SURGERY

## 2018-10-18 PROCEDURE — 63600175 PHARM REV CODE 636 W HCPCS: Performed by: ORTHOPAEDIC SURGERY

## 2018-10-18 PROCEDURE — C1713 ANCHOR/SCREW BN/BN,TIS/BN: HCPCS | Performed by: ORTHOPAEDIC SURGERY

## 2018-10-18 PROCEDURE — 71000033 HC RECOVERY, INTIAL HOUR: Performed by: ORTHOPAEDIC SURGERY

## 2018-10-18 PROCEDURE — 71000015 HC POSTOP RECOV 1ST HR: Performed by: ORTHOPAEDIC SURGERY

## 2018-10-18 PROCEDURE — 29826 SHO ARTHRS SRG DECOMPRESSION: CPT | Mod: AS,LT,, | Performed by: PHYSICIAN ASSISTANT

## 2018-10-18 PROCEDURE — 36000710: Performed by: ORTHOPAEDIC SURGERY

## 2018-10-18 PROCEDURE — 64415 NJX AA&/STRD BRCH PLXS IMG: CPT | Performed by: STUDENT IN AN ORGANIZED HEALTH CARE EDUCATION/TRAINING PROGRAM

## 2018-10-18 PROCEDURE — 37000009 HC ANESTHESIA EA ADD 15 MINS: Performed by: ORTHOPAEDIC SURGERY

## 2018-10-18 PROCEDURE — 29828 SHO ARTHRS SRG BICP TENODSIS: CPT | Mod: 51,AS,LT, | Performed by: PHYSICIAN ASSISTANT

## 2018-10-18 PROCEDURE — 29827 SHO ARTHRS SRG RT8TR CUF RPR: CPT | Mod: AS,22,LT, | Performed by: PHYSICIAN ASSISTANT

## 2018-10-18 PROCEDURE — 63600175 PHARM REV CODE 636 W HCPCS: Performed by: PHYSICIAN ASSISTANT

## 2018-10-18 PROCEDURE — 71000039 HC RECOVERY, EACH ADD'L HOUR: Performed by: ORTHOPAEDIC SURGERY

## 2018-10-18 PROCEDURE — 76942 ECHO GUIDE FOR BIOPSY: CPT | Performed by: STUDENT IN AN ORGANIZED HEALTH CARE EDUCATION/TRAINING PROGRAM

## 2018-10-18 PROCEDURE — 29828 SHO ARTHRS SRG BICP TENODSIS: CPT | Mod: 51,LT,, | Performed by: ORTHOPAEDIC SURGERY

## 2018-10-18 PROCEDURE — 36000711: Performed by: ORTHOPAEDIC SURGERY

## 2018-10-18 DEVICE — ANCHOR 4.5 HELIX ADV BR 3 SUT: Type: IMPLANTABLE DEVICE | Site: SHOULDER | Status: FUNCTIONAL

## 2018-10-18 RX ORDER — NEOSTIGMINE METHYLSULFATE 1 MG/ML
INJECTION, SOLUTION INTRAVENOUS
Status: DISCONTINUED | OUTPATIENT
Start: 2018-10-18 | End: 2018-10-18

## 2018-10-18 RX ORDER — PHENYLEPHRINE HYDROCHLORIDE 10 MG/ML
INJECTION INTRAVENOUS
Status: DISCONTINUED | OUTPATIENT
Start: 2018-10-18 | End: 2018-10-18

## 2018-10-18 RX ORDER — SODIUM CHLORIDE, SODIUM LACTATE, POTASSIUM CHLORIDE, CALCIUM CHLORIDE 600; 310; 30; 20 MG/100ML; MG/100ML; MG/100ML; MG/100ML
INJECTION, SOLUTION INTRAVENOUS CONTINUOUS PRN
Status: DISCONTINUED | OUTPATIENT
Start: 2018-10-18 | End: 2018-10-18

## 2018-10-18 RX ORDER — GLYCOPYRROLATE 0.2 MG/ML
INJECTION INTRAMUSCULAR; INTRAVENOUS
Status: DISCONTINUED | OUTPATIENT
Start: 2018-10-18 | End: 2018-10-18

## 2018-10-18 RX ORDER — BUPIVACAINE HYDROCHLORIDE AND EPINEPHRINE 5; 5 MG/ML; UG/ML
INJECTION, SOLUTION EPIDURAL; INTRACAUDAL; PERINEURAL
Status: DISCONTINUED | OUTPATIENT
Start: 2018-10-18 | End: 2018-10-18

## 2018-10-18 RX ORDER — MIDAZOLAM HYDROCHLORIDE 1 MG/ML
INJECTION, SOLUTION INTRAMUSCULAR; INTRAVENOUS
Status: DISCONTINUED | OUTPATIENT
Start: 2018-10-18 | End: 2018-10-18

## 2018-10-18 RX ORDER — SODIUM CHLORIDE 0.9 % (FLUSH) 0.9 %
3 SYRINGE (ML) INJECTION
Status: DISCONTINUED | OUTPATIENT
Start: 2018-10-18 | End: 2018-10-18 | Stop reason: HOSPADM

## 2018-10-18 RX ORDER — HYDROCODONE BITARTRATE AND ACETAMINOPHEN 5; 325 MG/1; MG/1
1 TABLET ORAL EVERY 6 HOURS PRN
Qty: 40 TABLET | Refills: 0 | Status: SHIPPED | OUTPATIENT
Start: 2018-10-18 | End: 2021-08-11

## 2018-10-18 RX ORDER — EPINEPHRINE 1 MG/ML
INJECTION, SOLUTION INTRACARDIAC; INTRAMUSCULAR; INTRAVENOUS; SUBCUTANEOUS
Status: DISCONTINUED | OUTPATIENT
Start: 2018-10-18 | End: 2018-10-18 | Stop reason: HOSPADM

## 2018-10-18 RX ORDER — ONDANSETRON 4 MG/1
4 TABLET, ORALLY DISINTEGRATING ORAL EVERY 8 HOURS PRN
Qty: 30 TABLET | Refills: 0 | Status: SHIPPED | OUTPATIENT
Start: 2018-10-18 | End: 2019-04-17

## 2018-10-18 RX ORDER — CHLORHEXIDINE GLUCONATE ORAL RINSE 1.2 MG/ML
10 SOLUTION DENTAL 2 TIMES DAILY
Status: DISCONTINUED | OUTPATIENT
Start: 2018-10-18 | End: 2018-10-18 | Stop reason: HOSPADM

## 2018-10-18 RX ORDER — ONDANSETRON 2 MG/ML
4 INJECTION INTRAMUSCULAR; INTRAVENOUS DAILY PRN
Status: COMPLETED | OUTPATIENT
Start: 2018-10-18 | End: 2018-10-18

## 2018-10-18 RX ORDER — CHLORHEXIDINE GLUCONATE ORAL RINSE 1.2 MG/ML
10 SOLUTION DENTAL
Status: DISCONTINUED | OUTPATIENT
Start: 2018-10-18 | End: 2018-10-18 | Stop reason: HOSPADM

## 2018-10-18 RX ORDER — BUPIVACAINE HYDROCHLORIDE AND EPINEPHRINE 5; 5 MG/ML; UG/ML
INJECTION, SOLUTION EPIDURAL; INTRACAUDAL; PERINEURAL
Status: COMPLETED
Start: 2018-10-18 | End: 2018-10-18

## 2018-10-18 RX ORDER — FENTANYL CITRATE 50 UG/ML
25 INJECTION, SOLUTION INTRAMUSCULAR; INTRAVENOUS EVERY 5 MIN PRN
Status: DISCONTINUED | OUTPATIENT
Start: 2018-10-18 | End: 2018-10-18 | Stop reason: HOSPADM

## 2018-10-18 RX ORDER — SUCCINYLCHOLINE CHLORIDE 20 MG/ML
INJECTION INTRAMUSCULAR; INTRAVENOUS
Status: DISCONTINUED | OUTPATIENT
Start: 2018-10-18 | End: 2018-10-18

## 2018-10-18 RX ORDER — FENTANYL CITRATE 50 UG/ML
INJECTION, SOLUTION INTRAMUSCULAR; INTRAVENOUS
Status: DISCONTINUED | OUTPATIENT
Start: 2018-10-18 | End: 2018-10-18

## 2018-10-18 RX ORDER — CEFAZOLIN SODIUM 2 G/50ML
2 SOLUTION INTRAVENOUS
Status: COMPLETED | OUTPATIENT
Start: 2018-10-18 | End: 2018-10-18

## 2018-10-18 RX ORDER — LIDOCAINE HYDROCHLORIDE 10 MG/ML
INJECTION INFILTRATION; PERINEURAL
Status: DISCONTINUED | OUTPATIENT
Start: 2018-10-18 | End: 2018-10-18

## 2018-10-18 RX ORDER — HYDROCODONE BITARTRATE AND ACETAMINOPHEN 5; 325 MG/1; MG/1
1 TABLET ORAL EVERY 4 HOURS PRN
Status: DISCONTINUED | OUTPATIENT
Start: 2018-10-18 | End: 2018-10-18 | Stop reason: HOSPADM

## 2018-10-18 RX ORDER — EPHEDRINE SULFATE 50 MG/ML
INJECTION, SOLUTION INTRAVENOUS
Status: DISCONTINUED | OUTPATIENT
Start: 2018-10-18 | End: 2018-10-18

## 2018-10-18 RX ORDER — ROCURONIUM BROMIDE 10 MG/ML
INJECTION, SOLUTION INTRAVENOUS
Status: DISCONTINUED | OUTPATIENT
Start: 2018-10-18 | End: 2018-10-18

## 2018-10-18 RX ORDER — PROPOFOL 10 MG/ML
VIAL (ML) INTRAVENOUS
Status: DISCONTINUED | OUTPATIENT
Start: 2018-10-18 | End: 2018-10-18

## 2018-10-18 RX ORDER — ONDANSETRON 2 MG/ML
INJECTION INTRAMUSCULAR; INTRAVENOUS
Status: DISCONTINUED | OUTPATIENT
Start: 2018-10-18 | End: 2018-10-18

## 2018-10-18 RX ADMIN — MIDAZOLAM 2 MG: 1 INJECTION INTRAMUSCULAR; INTRAVENOUS at 08:10

## 2018-10-18 RX ADMIN — EPHEDRINE SULFATE 5 MG: 50 INJECTION, SOLUTION INTRAMUSCULAR; INTRAVENOUS; SUBCUTANEOUS at 09:10

## 2018-10-18 RX ADMIN — SODIUM CHLORIDE, SODIUM LACTATE, POTASSIUM CHLORIDE, AND CALCIUM CHLORIDE: 600; 310; 30; 20 INJECTION, SOLUTION INTRAVENOUS at 08:10

## 2018-10-18 RX ADMIN — BUPIVACAINE HYDROCHLORIDE AND EPINEPHRINE BITARTRATE 20 ML: 5; .005 INJECTION, SOLUTION EPIDURAL; INTRACAUDAL; PERINEURAL at 12:10

## 2018-10-18 RX ADMIN — FENTANYL CITRATE 50 MCG: 50 INJECTION, SOLUTION INTRAMUSCULAR; INTRAVENOUS at 11:10

## 2018-10-18 RX ADMIN — FENTANYL CITRATE 50 MCG: 50 INJECTION, SOLUTION INTRAMUSCULAR; INTRAVENOUS at 08:10

## 2018-10-18 RX ADMIN — ONDANSETRON 4 MG: 2 INJECTION, SOLUTION INTRAMUSCULAR; INTRAVENOUS at 01:10

## 2018-10-18 RX ADMIN — ONDANSETRON 2 ML: 2 INJECTION, SOLUTION INTRAMUSCULAR; INTRAVENOUS at 11:10

## 2018-10-18 RX ADMIN — PHENYLEPHRINE HYDROCHLORIDE 200 MCG: 10 INJECTION INTRAVENOUS at 08:10

## 2018-10-18 RX ADMIN — EPHEDRINE SULFATE 10 MG: 50 INJECTION, SOLUTION INTRAMUSCULAR; INTRAVENOUS; SUBCUTANEOUS at 09:10

## 2018-10-18 RX ADMIN — LIDOCAINE HYDROCHLORIDE 1 ML: 10 INJECTION, SOLUTION INFILTRATION; PERINEURAL at 08:10

## 2018-10-18 RX ADMIN — PROPOFOL 120 MG: 10 INJECTION, EMULSION INTRAVENOUS at 08:10

## 2018-10-18 RX ADMIN — EPHEDRINE SULFATE 10 MG: 50 INJECTION, SOLUTION INTRAMUSCULAR; INTRAVENOUS; SUBCUTANEOUS at 08:10

## 2018-10-18 RX ADMIN — ROCURONIUM BROMIDE 10 MG: 10 INJECTION, SOLUTION INTRAVENOUS at 10:10

## 2018-10-18 RX ADMIN — ROBINUL 0.4 MG: 0.2 INJECTION INTRAMUSCULAR; INTRAVENOUS at 11:10

## 2018-10-18 RX ADMIN — SUCCINYLCHOLINE CHLORIDE 100 MG: 20 INJECTION, SOLUTION INTRAMUSCULAR; INTRAVENOUS at 08:10

## 2018-10-18 RX ADMIN — ROCURONIUM BROMIDE 30 MG: 10 INJECTION, SOLUTION INTRAVENOUS at 08:10

## 2018-10-18 RX ADMIN — NEOSTIGMINE METHYLSULFATE 5 MG: 1 INJECTION INTRAVENOUS at 11:10

## 2018-10-18 RX ADMIN — CEFAZOLIN SODIUM 2 G: 2 SOLUTION INTRAVENOUS at 08:10

## 2018-10-18 RX ADMIN — FENTANYL CITRATE 100 MCG: 50 INJECTION, SOLUTION INTRAMUSCULAR; INTRAVENOUS at 08:10

## 2018-10-18 NOTE — ANESTHESIA PREPROCEDURE EVALUATION
10/18/2018  Daniel Jones is a 78 y.o., female.    Pre-op Assessment    I have reviewed the Patient Summary Reports.     I have reviewed the Nursing Notes.   I have reviewed the Medications.     Review of Systems  Anesthesia Hx:  Hx of Anesthetic complications  History of prior surgery of interest to airway management or planning: Previous anesthesia: General Denies Family Hx of Anesthesia complications.   Denies Personal Hx of Anesthesia complications.   Social:  Former Smoker    Cardiovascular:   Exercise tolerance: good Hypertension    Pulmonary:   COPD, moderate MAIC (mycobacterium avium-intracellulare complex)   Hepatic/GI:   GERD    Neurological:   Neuromuscular Disease,   Chronic Pain Syndrome   Endocrine:   Hypothyroidism        Physical Exam  General:  Well nourished    Airway/Jaw/Neck:  Airway Findings: Mouth Opening: Normal Tongue: Normal  General Airway Assessment: Adult  Mallampati: II  TM Distance: Normal, at least 6 cm      Dental:  Dental Findings: In tact   Chest/Lungs:  Chest/Lungs Findings: Clear to auscultation, Normal Respiratory Rate     Heart/Vascular:  Heart Findings: Rate: Normal  Rhythm: Regular Rhythm  Sounds: Normal        Mental Status:  Mental Status Findings:  Cooperative, Alert and Oriented         Anesthesia Plan  Type of Anesthesia, risks & benefits discussed:  Anesthesia Type:  general  Patient's Preference:   Intra-op Monitoring Plan: standard ASA monitors  Intra-op Monitoring Plan Comments:   Post Op Pain Control Plan: multimodal analgesia and peripheral nerve block  Post Op Pain Control Plan Comments:   Induction:   IV  Beta Blocker:  Patient is not currently on a Beta-Blocker (No further documentation required).       Informed Consent: Patient understands risks and agrees with Anesthesia plan.  Questions answered.   ASA Score: 3     Day of Surgery Review of History &  Physical: I have interviewed and examined the patient. I have reviewed the patient's H&P dated:  There are no significant changes.  H&P update referred to the surgeon.

## 2018-10-18 NOTE — ANESTHESIA POSTPROCEDURE EVALUATION
"Anesthesia Post Evaluation    Patient: Daniel Jones    Procedure(s) Performed: Procedure(s) (LRB):  ARTHROSCOPY, SHOULDER, WITH SUBACROMIAL SPACE DECOMPRESSION (Left)  DEBRIDEMENT (Left)  REPAIR, ROTATOR CUFF, ARTHROSCOPIC (Left)  TENOTOMY, BICEPS, ARTHROSCOPIC (Left)    Final Anesthesia Type: general  Patient location during evaluation: PACU  Patient participation: Yes- Able to Participate  Level of consciousness: awake and alert  Post-procedure vital signs: reviewed and stable  Pain management: adequate  Airway patency: patent  PONV status at discharge: No PONV  Anesthetic complications: no      Cardiovascular status: blood pressure returned to baseline  Hydration status: euvolemic  Follow-up not needed.        Visit Vitals  BP (!) 141/65   Pulse 88   Temp 36.2 °C (97.1 °F) (Temporal)   Resp 13   Ht 5' 3" (1.6 m)   Wt 57.8 kg (127 lb 6.8 oz)   SpO2 99%   Breastfeeding? No   BMI 22.57 kg/m²       Pain/Sofia Score: Pain Assessment Performed: Yes (10/18/2018 11:45 AM)  Presence of Pain: non-verbal indicators absent (10/18/2018 11:45 AM)  Sofia Score: 7 (10/18/2018 11:45 AM)        "

## 2018-10-18 NOTE — ANESTHESIA RELEASE NOTE
"Anesthesia Release from PACU Note    Patient: Daniel Jones    Procedure(s) Performed: Procedure(s) (LRB):  ARTHROSCOPY, SHOULDER, WITH SUBACROMIAL SPACE DECOMPRESSION (Left)  DEBRIDEMENT (Left)  REPAIR, ROTATOR CUFF, ARTHROSCOPIC (Left)  TENOTOMY, BICEPS, ARTHROSCOPIC (Left)    Anesthesia type: general    Post pain: Adequate analgesia    Post assessment: no apparent anesthetic complications    Last Vitals:   Visit Vitals  BP (!) 141/65   Pulse 88   Temp 36.2 °C (97.1 °F) (Temporal)   Resp 13   Ht 5' 3" (1.6 m)   Wt 57.8 kg (127 lb 6.8 oz)   SpO2 99%   Breastfeeding? No   BMI 22.57 kg/m²       Post vital signs: stable    Level of consciousness: awake    Nausea/Vomiting: no nausea/no vomiting    Complications: none    Airway Patency: patent    Respiratory: unassisted    Cardiovascular: stable and blood pressure at baseline    Hydration: euvolemic  "

## 2018-10-18 NOTE — ADDENDUM NOTE
Addendum  created 10/18/18 1228 by Collin Melendez MD    Child order released for a procedure order, Intraprocedure Blocks edited, Intraprocedure Meds edited, LDA created via procedure documentation, Sign clinical note

## 2018-10-18 NOTE — ANESTHESIA PROCEDURE NOTES
Peripheral nerve block     Patient location during procedure: post-op   Block not for primary anesthetic.  Reason for block: at surgeon's request and post-op pain management   Post-op Pain Location: left shoulder  Start time: 10/18/2018 12:11 PM  Timeout: 10/18/2018 12:11 PM   End time: 10/18/2018 12:20 PM  Staffing  Anesthesiologist: Collin Melendez MD  Performed: anesthesiologist   Preanesthetic Checklist  Completed: patient identified, site marked, surgical consent, pre-op evaluation, timeout performed, IV checked, risks and benefits discussed and monitors and equipment checked  Peripheral Block  Patient position: sitting  Prep: ChloraPrep  Patient monitoring: heart rate, cardiac monitor and continuous pulse ox  Block type: interscalene  Laterality: left  Injection technique: single shot  Needle  Needle type: Echogenic   Needle gauge: 21 G  Needle length: 2 in  Needle localization: ultrasound guidance   -ultrasound image captured on disc.  Assessment  Injection assessment: negative aspiration and negative parasthesia  Paresthesia pain: none  Heart rate change: no  Slow fractionated injection: yes

## 2018-10-18 NOTE — INTERVAL H&P NOTE
The patient has been examined and the H&P has been reviewed:    I concur with the findings and no changes have occurred since H&P was written.    Anesthesia/Surgery risks, benefits and alternative options discussed and understood by patient/family.          Active Hospital Problems    Diagnosis  POA    Rotator cuff impingement syndrome of left shoulder [M75.42]  Yes      Resolved Hospital Problems   No resolved problems to display.

## 2018-10-18 NOTE — OP NOTE
Date of Surgery:  10/18/2018    PREOPERATIVE DIAGNOSES:   1. Left shoulder massive rotator cuff tear.   2. Left shoulder biceps tendinopathy    POSTOPERATIVE DIAGNOSES:   1. Left shoulder massive rotator cuff tear.   2. Left shoulder biceps tendinopathy  3. Left shoulder labral tear/degeneration    PROCEDURE:   1. Left shoulder arthroscopic rotator cuff repair (massive / complex).   2. Left shoulder arthroscopic subacromial decompression.   3. Left shoulder arthroscopic biceps tenodesis (auto)    SURGEON: Cristofer Hall M.D.     Complexity:  *Note, because of the complex nature of the rotator cuff repair, with the massive tear, approximately 40-50% more resources were used for mobilization of the retracted tissue, margin convergence suturing and tissue handling.    ASSISTANT: Angie OLSEN    The use of Angie OLSNE  was medically necessary for patient positioning, skin retraction, closure and assistance with this procedure. The procedure could not be performed properly without the use of her as an assistant. There was no qualified resident/fellow available for assistance with this procedure.       COMPLICATIONS: None.     POSITION: Beach chair.     ANESTHESIA: General endotracheal     EXAMINATION UNDER ANESTHESIA: Left shoulder forward flexion 180 degrees,   abduction 120 degrees, full internal and external rotation   1+ anterior drawer, 1+ sulcus sign, 1+ posterior drawer.     ARTHROSCOPIC FINDINGS:   1. Massive U-shaped supraspinatus and infraspinatus rotator cuff tear.   2. Anterior acromial spur.   3. High grade biceps tendinopathy with near full thickness tearing  4. Superior labral fraying    INDICATIONS FOR PROCEDURE: Daniel Jones is a 78 y.o. female  who has pain in her Left shoulder. MRI confirms a complete, massive tear of her supraspinatus and infraspinatus tendons. After risks and benefits of surgery were discussed, she elects to proceed with operative  intervention. All risks and  benefits have been discussed including the risks of stiffness for recurrent instability, irreparability of the tear,   damage to neurovascular structures, damage to cartilage and the risk of anesthesia including stroke and heart attack. The patient seemed to understand and wished to proceed.     DESCRIPTION OF PROCEDURE: The patient was brought in the room. After   undergoing general endotracheal anesthesia, she was placed in a well-padded modified beachchair   position. Perioperative antibiotics were given. The left upper extremity   was prepped and draped in usual sterile fashion including the use of a   sterile Trimano arm aj.     After time-out was performed, the standard posterior portal and anterior   superior portal were created. Diagnostic arthroscopy performed.   The glenohumeral joint was entered. There was no chondromalacia noted in   the glenoid or humeral head. There was minimal fraying at the superior labrum. The anterior inferior labrum and posterior labrum were intact without evidence of tearing. The subscapularis was intact with no evidence of tearing.    BICEPS Tenodesis (auto):  The biceps tendon was found to have high grade tendinopathy and tearing. Biceps auto-tenodesis was performed using thermal device.  Part of superior labrum was included to allow the biceps tendon to auto-tenodese.  Attention was then turned to the rotator cuff.  The rotator cuff undersurface was then visualized and debrided as needed using a shaver.      Oscillating shaver, straight and curved biters were used to debride a small flap of tissue off the superior labrum.    SUBACROMIAL DECOMPRESSION: The scope was taken out and redirected in the   subacromial space. After excellent visualization was achieved, a lateral   portal was created. The bursal tissue was cleaned off. The rotator cuff tear was identified. The area was cleaned off for later repair. The undersurface of the acromion was cleaned off of soft tissues  including releasing the CA ligament. A 5-mm destiney was   introduced through the posterior portal and decompression was completed   using cutting block technique down to a type 1 configuration.     MASSIVE ROTATOR CUFF REPAIR: The footprint of rotator cuff was cleaned off with   Mitek VAPR device and oscillating shaver. This was judged to be a massive U-shaped tear.    *Significant releases were made superiorly and inferiorly on the supraspinatus and infraspinatus to allow mobilization of the rotator cuff.  Anteriorly, the tissue was released to the base of the coracoid.  Posteriorly, the tissue was released to the base of the scapular spine.  Following releases, this was judged to be amenable for attempted repair as a massive U-shaped tear, with margin convergence sutures and one centrally placed triple loaded suture anchor.    *Margin convergence sutures were placed starting posterior-medially and finishing ryne-laterally.  Margin convergence sutures were placed which converged our cuff tissue well and balanced our repair from posterior to anterior.  Our rotator cuff tissue covered the humeral head and was brought to cover the footprint.  The tissue was mobile enough for suture anchor repair on the to cover the supraspinatus and infraspinatus footprint.    One 5.5 mm anchor was placed in the center aspect of the footprint of the supraspinatus. All 6 suture limbs were brought through in a large horizontal mattress convergence type sutures configuration using a suture penetrator. These were tied arthroscopically down. Excellent footprint coverage was achieved after all arthroscopic knots were tied down. Internal and external rotation confirmed excellent footprint compression with no evidence of gap formation.     Portal sites were closed with 3-0 Nylon, covered with , Xeroform, 4 x 4 fluffs, ABD pads and tape. The patient was   placed in a sling and pillow for protection, was extubated in the room,   transferred to  recovery room in stable condition accompanied by her   physician.    There were no complications in the case. I was present, scrubbed and did perform all critical portions of the case.     Postop plan for the patient is to follow the massive size rotator cuff repair protocol with no therapy for 6-8 weeks.

## 2018-10-18 NOTE — DISCHARGE INSTRUCTIONS
General Information:    1. Do not drink alcoholic beverages including beer for 24 hours or as long as you are on pain medication..  2. Do not drive a motor vehicle, operate machinery or power tools, or signs legal papers for 24 hours or as long as you are on pain medication.   3. You may experience light-headedness, dizziness, and sleepiness following surgery. Please do not stay alone. A responsible adult should be with you for this 24 hour period.  4. Go home and rest.  5. Progress slowly to a normal diet unless instructed.  Otherwise, begin with liquids such as soft drinks, then soup and crackers working up to solid foods. Drink plenty of nonalcoholic fluids.  6. Certain anesthetics and pain medications produce nausea and vomiting in certain individuals. If nausea becomes a problem at home, call you doctor.  7. A nurse will be calling you sometime after surgery. Do not be alarmed. This is our way of finding out how you are doing.  8. Several times every hour while you are awake, take 2-3 deep breaths and cough. If you had stomach surgery hold a pillow or rolled towel firmly against your stomach before you cough. This will help with any pain the cough might cause.  9. Several times every hour while you are awake, pump and flex your feet 5-6 times and do foot circles. This will help prevent blood clots.  10. Call your doctor for severe pain, bleeding, fever, or signs or symptoms of infection (pain, swelling, redness, foul odor, drainage).  11. You can contact your doctor anytime by [phone] for the [facility] (at [facility]) or [phone] for the [facility] on [facility].   [email or web address] is another way to contact your doctor if you are an active participant online with [facility].  12. Continue Nozin provided at discharge twice daily for 7 days or until the incision is healed.  See pamphlet or box for instructions.

## 2018-10-18 NOTE — TRANSFER OF CARE
"Anesthesia Transfer of Care Note    Patient: Daniel Jones    Procedure(s) Performed: Procedure(s) (LRB):  ARTHROSCOPY, SHOULDER, WITH SUBACROMIAL SPACE DECOMPRESSION (Left)  DEBRIDEMENT (Left)  REPAIR, ROTATOR CUFF, ARTHROSCOPIC (Left)  TENOTOMY, BICEPS, ARTHROSCOPIC (Left)    Patient location: PACU    Anesthesia Type: general    Transport from OR: Transported from OR on room air with adequate spontaneous ventilation    Post pain: adequate analgesia    Post assessment: no apparent anesthetic complications    Post vital signs: stable    Level of consciousness: awake, alert and oriented    Complications: none    Transfer of care protocol was followed      Last vitals:   Visit Vitals  BP (!) 142/65   Pulse 91   Temp 36.2 °C (97.1 °F) (Temporal)   Resp 20   Ht 5' 3" (1.6 m)   Wt 57.8 kg (127 lb 6.8 oz)   SpO2 97%   Breastfeeding? No   BMI 22.57 kg/m²     "

## 2018-10-19 ENCOUNTER — TELEPHONE (OUTPATIENT)
Dept: ORTHOPEDICS | Facility: CLINIC | Age: 78
End: 2018-10-19

## 2018-10-19 DIAGNOSIS — M75.122 COMPLETE TEAR OF LEFT ROTATOR CUFF: Primary | ICD-10-CM

## 2018-10-19 RX ORDER — TRAMADOL HYDROCHLORIDE 50 MG/1
50 TABLET ORAL EVERY 6 HOURS PRN
Qty: 40 TABLET | Refills: 0 | Status: SHIPPED | OUTPATIENT
Start: 2018-10-19 | End: 2018-10-29

## 2018-10-19 NOTE — TELEPHONE ENCOUNTER
Returned patient's phone call regarding antibiotics. I explained to her that a course of antibiotics was not necessary at this time. She expressed that her pain was not being well controlled with the Norco 5 q6h and that she was taking them every 3-4hours. I advised the patient to take Norco 5 q6h as prescribed and I would call in Tramadol 50 q6h to take in between Norco.     BM

## 2018-11-01 VITALS
HEART RATE: 100 BPM | SYSTOLIC BLOOD PRESSURE: 141 MMHG | TEMPERATURE: 98 F | BODY MASS INDEX: 22.58 KG/M2 | RESPIRATION RATE: 18 BRPM | OXYGEN SATURATION: 96 % | DIASTOLIC BLOOD PRESSURE: 63 MMHG | WEIGHT: 127.44 LBS | HEIGHT: 63 IN

## 2018-11-02 ENCOUNTER — OFFICE VISIT (OUTPATIENT)
Dept: ORTHOPEDICS | Facility: CLINIC | Age: 78
End: 2018-11-02
Payer: MEDICARE

## 2018-11-02 VITALS
TEMPERATURE: 98 F | HEIGHT: 63 IN | BODY MASS INDEX: 22.5 KG/M2 | SYSTOLIC BLOOD PRESSURE: 119 MMHG | DIASTOLIC BLOOD PRESSURE: 66 MMHG | WEIGHT: 127 LBS | HEART RATE: 77 BPM | RESPIRATION RATE: 18 BRPM

## 2018-11-02 DIAGNOSIS — M75.122 COMPLETE TEAR OF LEFT ROTATOR CUFF: Primary | ICD-10-CM

## 2018-11-02 PROCEDURE — 99024 POSTOP FOLLOW-UP VISIT: CPT | Mod: POP,,, | Performed by: ORTHOPAEDIC SURGERY

## 2018-11-02 PROCEDURE — 99999 PR PBB SHADOW E&M-EST. PATIENT-LVL III: CPT | Mod: PBBFAC,,, | Performed by: ORTHOPAEDIC SURGERY

## 2018-11-02 PROCEDURE — 99213 OFFICE O/P EST LOW 20 MIN: CPT | Mod: PBBFAC,PO | Performed by: ORTHOPAEDIC SURGERY

## 2018-11-02 RX ORDER — ETODOLAC 400 MG/1
400 TABLET, FILM COATED ORAL
COMMUNITY
Start: 2018-08-28

## 2018-11-02 RX ORDER — AMLODIPINE BESYLATE 5 MG/1
TABLET ORAL
COMMUNITY
Start: 2018-09-13

## 2018-11-02 RX ORDER — ESOMEPRAZOLE MAGNESIUM 40 MG/1
40 CAPSULE, DELAYED RELEASE ORAL
COMMUNITY
Start: 2018-08-28 | End: 2019-04-17

## 2018-11-02 RX ORDER — ALENDRONATE SODIUM 70 MG/1
70 TABLET ORAL
COMMUNITY
Start: 2018-08-28

## 2018-11-02 RX ORDER — AMITRIPTYLINE HYDROCHLORIDE 10 MG/1
20 TABLET, FILM COATED ORAL
COMMUNITY
End: 2019-04-17

## 2018-11-02 NOTE — PROGRESS NOTES
Subjective:     Patient ID: Daniel Jones is a 78 y.o. female.    Chief Complaint: Post-op Evaluation of the Left Shoulder    Doing well, pain improved.    Date of Surgery:  10/18/2018     PREOPERATIVE DIAGNOSES:   1. Left shoulder massive rotator cuff tear.   2. Left shoulder biceps tendinopathy     POSTOPERATIVE DIAGNOSES:   1. Left shoulder massive rotator cuff tear.   2. Left shoulder biceps tendinopathy  3. Left shoulder labral tear/degeneration     PROCEDURE:   1. Left shoulder arthroscopic rotator cuff repair (massive / complex).   2. Left shoulder arthroscopic subacromial decompression.   3. Left shoulder arthroscopic biceps tenodesis (auto)     SURGEON: Cristofer Hall M.D.       Shoulder Pain    The pain is present in the left shoulder. This is a new problem. The current episode started more than 1 month ago. The problem occurs intermittently. The problem has been gradually worsening. The quality of the pain is described as sharp. The pain is at a severity of 0/10. Associated symptoms include stiffness. Pertinent negatives include no fever or numbness. The symptoms are aggravated by activity. She has tried rest and OTC pain meds for the symptoms. The treatment provided significant relief. Physical therapy was not tried.      Past Medical History:   Diagnosis Date    Arthritis     General anesthetics causing adverse effect in therapeutic use     Hypertension     Thyroid disease      Past Surgical History:   Procedure Laterality Date    ARTHROSCOPIC REPAIR OF ROTATOR CUFF OF SHOULDER Left 10/18/2018    Procedure: REPAIR, ROTATOR CUFF, ARTHROSCOPIC;  Surgeon: Cristofer Hall MD;  Location: [facility] OR;  Service: Orthopedics;  Laterality: Left;    ARTHROSCOPY OF SHOULDER WITH DECOMPRESSION OF SUBACROMIAL SPACE Left 10/18/2018    Procedure: ARTHROSCOPY, SHOULDER, WITH SUBACROMIAL SPACE DECOMPRESSION;  Surgeon: Cristofer Hall MD;  Location: [facility] OR;  Service: Orthopedics;  Laterality: Left;     ARTHROSCOPY, SHOULDER, WITH SUBACROMIAL SPACE DECOMPRESSION Left 10/18/2018    Performed by Cristofer Hall MD at [facility] OR    DEBRIDEMENT Left 10/18/2018    Procedure: DEBRIDEMENT;  Surgeon: Cristofer Hall MD;  Location: [facility] OR;  Service: Orthopedics;  Laterality: Left;    DEBRIDEMENT Left 10/18/2018    Performed by Cristofer Hall MD at [facility] OR    DILATION AND CURETTAGE OF UTERUS      REPAIR, ROTATOR CUFF, ARTHROSCOPIC Left 10/18/2018    Performed by Cristofer Hall MD at [facility] OR    TENODESIS ARTHROSCOPIC (TENDON FIXATION) Left 10/18/2018    Performed by Cristofer Hall MD at [facility] OR    tonsilectomy N/A      Family History   Problem Relation Age of Onset    Cancer Mother     Heart failure Father     Hypertension Father     Diabetes Father     Diabetes Maternal Grandmother     Diabetes Paternal Grandmother      Social History     Socioeconomic History    Marital status:      Spouse name: Not on file    Number of children: Not on file    Years of education: Not on file    Highest education level: Not on file   Social Needs    Financial resource strain: Not on file    Food insecurity - worry: Not on file    Food insecurity - inability: Not on file    Transportation needs - medical: Not on file    Transportation needs - non-medical: Not on file   Occupational History    Not on file   Tobacco Use    Smoking status: Former Smoker     Types: Cigarettes     Last attempt to quit: 1970     Years since quittin.8    Smokeless tobacco: Never Used   Substance and Sexual Activity    Alcohol use: No    Drug use: No    Sexual activity: No   Other Topics Concern    Not on file   Social History Narrative    Not on file        Medication List           Accurate as of 18 10:19 AM. If you have any questions, ask your nurse or doctor.               CHANGE how you take these medications    ipratropium 0.02 % nebulizer solution  Commonly known as:   ATROVENT  Take 2.5 mLs (500 mcg total) by nebulization 4 (four) times daily.  What changed:  when to take this     montelukast 10 mg tablet  Commonly known as:  SINGULAIR  Take 1 tablet (10 mg total) by mouth once daily.  What changed:  when to take this        CONTINUE taking these medications    alendronate 70 MG tablet  Commonly known as:  FOSAMAX     * amitriptyline 10 MG tablet  Commonly known as:  ELAVIL     * amitriptyline 10 MG tablet  Commonly known as:  ELAVIL     amLODIPine 5 MG tablet  Commonly known as:  NORVASC     aspirin 325 MG EC tablet  Commonly known as:  ECOTRIN     CO Q-10 100 mg capsule  Generic drug:  coenzyme Q10     * esomeprazole 40 MG capsule  Commonly known as:  NEXIUM     * esomeprazole 40 MG capsule  Commonly known as:  NEXIUM     etodolac 400 MG tablet  Commonly known as:  LODINE     FLUAD 4003-0981 (65 YR UP)(PF) 45 mcg (15 mcg x 3)/0.5 mL Syrg  Generic drug:  flu vac-2017 [id number](PF)     fluocinonide 0.05% 0.05 % cream  Commonly known as:  LIDEX     * FLUZONE HIGH-DOSE 2018-19 (PF) 180 mcg/0.5 mL vaccine  Generic drug:  influenza     * FLUZONE HIGH-DOSE 2018-19 (PF) 180 mcg/0.5 mL vaccine  Generic drug:  influenza     HYDROcodone-acetaminophen 5-325 mg per tablet  Commonly known as:  NORCO  Take 1 tablet by mouth every 6 (six) hours as needed for Pain.     levothyroxine 88 MCG tablet  Commonly known as:  SYNTHROID     losartan 100 MG tablet  Commonly known as:  COZAAR     ondansetron 4 MG Tbdl  Commonly known as:  ZOFRAN-ODT  Take 1 tablet (4 mg total) by mouth every 8 (eight) hours as needed.     rosuvastatin 5 MG tablet  Commonly known as:  CRESTOR     VIOS AEROSOL DELIVERY SYSTEM Abby  Generic drug:  nebulizer and compressor         * This list has 6 medication(s) that are the same as other medications prescribed for you. Read the directions carefully, and ask your doctor or other care provider to review them with you.              Review of patient's allergies indicates:    Allergen Reactions    Sulfamethoxazole-trimethoprim Hives     Other reaction(s): Hives    Codeine      Can't tolerated oral form of codeine     Review of Systems   Constitution: Negative for fever and night sweats.   HENT: Positive for hearing loss.    Eyes: Negative for blurred vision and visual disturbance.   Cardiovascular: Negative for chest pain and leg swelling.   Respiratory: Negative for shortness of breath.    Endocrine: Negative for polyuria.   Hematologic/Lymphatic: Negative for bleeding problem.   Skin: Negative for rash.   Musculoskeletal: Positive for joint pain and stiffness. Negative for back pain, joint swelling, muscle cramps and muscle weakness.   Gastrointestinal: Negative for melena.   Genitourinary: Negative for hematuria.   Neurological: Negative for loss of balance, numbness and paresthesias.   Psychiatric/Behavioral: Negative for altered mental status.       Objective:   Body mass index is 22.5 kg/m².  Vitals:    11/02/18 0840   BP: 119/66   Pulse: 77   Resp: 18   Temp: 98.3 °F (36.8 °C)       General: Daniel is well-developed, well-nourished, appears stated age, in no acute distress, alert and oriented to time, place and person.                   Left Shoulder Exam     Other   Sensation: normal     Comments:  Incision clean/dry/intact  Sutures in place  No erythema/drainage/signs of infection  No calf tenderness        Vascular Exam       Capillary Refill  Left Hand: normal capillary refill    Salas Shaw MD 9/5/2018       Narrative     EXAMINATION:  MRI SHOULDER WITHOUT CONTRAST LEFT    CLINICAL HISTORY:  Arthritis, shoulder;Shoulder pain, prior xray, bursitis / tenosynovitis suspected;  Primary osteoarthritis, left shoulder    TECHNIQUE:  Multiplanar/multisequence MRI of the left shoulder was performed without the use of intravenous or intra-articular gadolinium.    COMPARISON:  None    FINDINGS:  Rotator cuff: There is moderate tendinosis with a full-thickness tear involving  the near full width of the supraspinatus tendon just proximal to the insertion with approximately 9 mm of retraction.  Tear likely involves a few anterior fibers of the infraspinatus tendon.  There is mild-to-moderate tendinosis of the subscapularis without definite tear.  Teres minor tendon is intact.  Rotator cuff muscle bulk is preserved.    Labrum: Global degenerative changes.    Long head of the biceps: There is suggestion of a possible longitudinal split thickness tear of the extra-articular portion of the long head of the biceps tendon without evidence of retraction.  Intra-articular portion appears intact.    Bones: Marrow signal is within normal limits with the exception of mild cystic change underlying the rotator cuff footprint.  No evidence of fracture or marrow replacement process.    AC joint: Lateral downsloping of the acromion noted.    Cartilage: No large glenohumeral articular cartilage defect demonstrated on this non arthrographic study.  There is suggestion of possible mild degenerative marginal spurring along the inferior aspect of the glenohumeral joint.    Miscellaneous: There is a trace glenohumeral joint effusion.  Mild fluid distention of the subacromial/subdeltoid bursa may be related to underlying rotator cuff pathology and/or bursitis.      Impression       1. Large full-thickness tear of the supraspinatus as above.  2. Mild to moderate tendinosis of the subscapularis tendon without definite tear.  3. Possible split thickness tear of the extra-articular portion of the long head of the biceps tendon without evidence of retraction.  4. Findings suggesting subacromial/subdeltoid bursitis.  5. Lateral downsloping of the acromion noted      Electronically signed by: Salas Shaw MD  Date: 09/05/2018  Time: 16:44            Assessment:     Encounter Diagnosis   Name Primary?    Complete tear of left rotator cuff Yes        Plan:     1. Sutures out    2. NWB, maintain sling-massive  cuff    3. F/up 4wk

## 2018-11-30 ENCOUNTER — OFFICE VISIT (OUTPATIENT)
Dept: ORTHOPEDICS | Facility: CLINIC | Age: 78
End: 2018-11-30
Payer: MEDICARE

## 2018-11-30 VITALS
WEIGHT: 127 LBS | HEIGHT: 63 IN | BODY MASS INDEX: 22.5 KG/M2 | SYSTOLIC BLOOD PRESSURE: 122 MMHG | HEART RATE: 75 BPM | DIASTOLIC BLOOD PRESSURE: 59 MMHG

## 2018-11-30 DIAGNOSIS — Z98.890 STATUS POST ARTHROSCOPY OF LEFT SHOULDER: Primary | ICD-10-CM

## 2018-11-30 PROCEDURE — 99024 POSTOP FOLLOW-UP VISIT: CPT | Mod: POP,,, | Performed by: ORTHOPAEDIC SURGERY

## 2018-11-30 PROCEDURE — 99999 PR PBB SHADOW E&M-EST. PATIENT-LVL III: CPT | Mod: PBBFAC,,, | Performed by: ORTHOPAEDIC SURGERY

## 2018-11-30 PROCEDURE — 99213 OFFICE O/P EST LOW 20 MIN: CPT | Mod: PBBFAC,PO | Performed by: ORTHOPAEDIC SURGERY

## 2018-11-30 NOTE — PROGRESS NOTES
Subjective:     Patient ID: Daniel Jones is a 78 y.o. female.    Chief Complaint: Pain and Post-op Evaluation of the Left Shoulder    Doing well, pain improved.    Date of Surgery:  10/18/2018     PREOPERATIVE DIAGNOSES:   1. Left shoulder massive rotator cuff tear.   2. Left shoulder biceps tendinopathy     POSTOPERATIVE DIAGNOSES:   1. Left shoulder massive rotator cuff tear.   2. Left shoulder biceps tendinopathy  3. Left shoulder labral tear/degeneration     PROCEDURE:   1. Left shoulder arthroscopic rotator cuff repair (massive / complex).   2. Left shoulder arthroscopic subacromial decompression.   3. Left shoulder arthroscopic biceps tenodesis (auto)     SURGEON: Cristofer Hall M.D.       Shoulder Pain    The pain is present in the left shoulder. This is a new problem. The current episode started more than 1 month ago. The problem occurs intermittently. The problem has been gradually worsening. The quality of the pain is described as sharp. The pain is at a severity of 1/10. Associated symptoms include stiffness. Pertinent negatives include no fever or numbness. The symptoms are aggravated by activity. She has tried rest and OTC pain meds for the symptoms. The treatment provided significant relief. Physical therapy was not tried.      Past Medical History:   Diagnosis Date    Arthritis     General anesthetics causing adverse effect in therapeutic use     Hypertension     Thyroid disease      Past Surgical History:   Procedure Laterality Date    ARTHROSCOPIC REPAIR OF ROTATOR CUFF OF SHOULDER Left 10/18/2018    Procedure: REPAIR, ROTATOR CUFF, ARTHROSCOPIC;  Surgeon: Cristofer Hall MD;  Location: [facility] OR;  Service: Orthopedics;  Laterality: Left;    ARTHROSCOPY OF SHOULDER WITH DECOMPRESSION OF SUBACROMIAL SPACE Left 10/18/2018    Procedure: ARTHROSCOPY, SHOULDER, WITH SUBACROMIAL SPACE DECOMPRESSION;  Surgeon: Cristofer Hall MD;  Location: [facility] OR;  Service: Orthopedics;   Laterality: Left;    ARTHROSCOPY, SHOULDER, WITH SUBACROMIAL SPACE DECOMPRESSION Left 10/18/2018    Performed by [facility] Hall [facility] at [facility] OR    DEBRIDEMENT Left 10/18/2018    Procedure: DEBRIDEMENT;  Surgeon: Cristofer Hall MD;  Location: [facility] OR;  Service: Orthopedics;  Laterality: Left;    DEBRIDEMENT Left 10/18/2018    Performed by [facility]er Hall MD at [facility] OR    DILATION AND CURETTAGE OF UTERUS      REPAIR, ROTATOR CUFF, ARTHROSCOPIC Left 10/18/2018    Performed by [facility] at [facility] OR    TENODESIS ARTHROSCOPIC (TENDON FIXATION) Left 10/18/2018    Performed by [facility] Hall [facility] at [facility] OR    tonsilectomy N/A      Family History   Problem Relation Age of Onset    Cancer Mother     Heart failure Father     Hypertension Father     Diabetes Father     Diabetes Maternal Grandmother     Diabetes Paternal Grandmother      Social History     Socioeconomic History    Marital status:      Spouse name: Not on file    Number of children: Not on file    Years of education: Not on file    Highest education level: Not on file   Social Needs    Financial resource strain: Not on file    Food insecurity - worry: Not on file    Food insecurity - inability: Not on file    Transportation needs - medical: Not on file    Transportation needs - non-medical: Not on file   Occupational History    Not on file   Tobacco Use    Smoking status: Former Smoker     Types: Cigarettes     Last attempt to quit: 1970     Years since quittin.9    Smokeless tobacco: Never Used   Substance and Sexual Activity    Alcohol use: No    Drug use: No    Sexual activity: No   Other Topics Concern    Not on file   Social History Narrative    Not on file        Medication List           Accurate as of 18  9:17 AM. If you have any questions, ask your nurse or doctor.               CHANGE how you take these medications    ipratropium 0.02 % nebulizer solution  Commonly  known as:  ATROVENT  Take 2.5 mLs (500 mcg total) by nebulization 4 (four) times daily.  What changed:  when to take this     montelukast 10 mg tablet  Commonly known as:  SINGULAIR  Take 1 tablet (10 mg total) by mouth once daily.  What changed:  when to take this        CONTINUE taking these medications    alendronate 70 MG tablet  Commonly known as:  FOSAMAX     * amitriptyline 10 MG tablet  Commonly known as:  ELAVIL     * amitriptyline 10 MG tablet  Commonly known as:  ELAVIL     amLODIPine 5 MG tablet  Commonly known as:  NORVASC     aspirin 325 MG EC tablet  Commonly known as:  ECOTRIN     CO Q-10 100 mg capsule  Generic drug:  coenzyme Q10     * esomeprazole 40 MG capsule  Commonly known as:  NEXIUM     * esomeprazole 40 MG capsule  Commonly known as:  NEXIUM     etodolac 400 MG tablet  Commonly known as:  LODINE     FLUAD 5626-0173 (65 YR UP)(PF) 45 mcg (15 mcg x 3)/0.5 mL Syrg  Generic drug:  flu vac-2017 [id number](PF)     fluocinonide 0.05% 0.05 % cream  Commonly known as:  LIDEX     * FLUZONE HIGH-DOSE 2018-19 (PF) 180 mcg/0.5 mL vaccine  Generic drug:  influenza     * FLUZONE HIGH-DOSE 2018-19 (PF) 180 mcg/0.5 mL vaccine  Generic drug:  influenza     HYDROcodone-acetaminophen 5-325 mg per tablet  Commonly known as:  NORCO  Take 1 tablet by mouth every 6 (six) hours as needed for Pain.     levothyroxine 88 MCG tablet  Commonly known as:  SYNTHROID     losartan 100 MG tablet  Commonly known as:  COZAAR     ondansetron 4 MG Tbdl  Commonly known as:  ZOFRAN-ODT  Take 1 tablet (4 mg total) by mouth every 8 (eight) hours as needed.     rosuvastatin 5 MG tablet  Commonly known as:  CRESTOR     VIOS AEROSOL DELIVERY SYSTEM Abby  Generic drug:  nebulizer and compressor         * This list has 6 medication(s) that are the same as other medications prescribed for you. Read the directions carefully, and ask your doctor or other care provider to review them with you.              Review of patient's allergies  indicates:   Allergen Reactions    Sulfamethoxazole-trimethoprim Hives     Other reaction(s): Hives    Codeine      Can't tolerated oral form of codeine     Review of Systems   Constitution: Negative for fever and night sweats.   HENT: Positive for hearing loss.    Eyes: Negative for blurred vision and visual disturbance.   Cardiovascular: Negative for chest pain and leg swelling.   Respiratory: Negative for shortness of breath.    Endocrine: Negative for polyuria.   Hematologic/Lymphatic: Negative for bleeding problem.   Skin: Negative for rash.   Musculoskeletal: Positive for joint pain and stiffness. Negative for back pain, joint swelling, muscle cramps and muscle weakness.   Gastrointestinal: Negative for melena.   Genitourinary: Negative for hematuria.   Neurological: Negative for loss of balance, numbness and paresthesias.   Psychiatric/Behavioral: Negative for altered mental status.       Objective:   Body mass index is 22.5 kg/m².  Vitals:    11/30/18 0746   BP: (!) 122/59   Pulse: 75       General: Daniel is well-developed, well-nourished, appears stated age, in no acute distress, alert and oriented to time, place and person.                   Left Shoulder Exam     Other   Sensation: normal     Comments:  Incision healed  No erythema/drainage/signs of infection  No calf tenderness      PROM ff 90, er30      Vascular Exam       Capillary Refill  Left Hand: normal capillary refill    Salas Shaw MD 9/5/2018       Narrative     EXAMINATION:  MRI SHOULDER WITHOUT CONTRAST LEFT    CLINICAL HISTORY:  Arthritis, shoulder;Shoulder pain, prior xray, bursitis / tenosynovitis suspected;  Primary osteoarthritis, left shoulder    TECHNIQUE:  Multiplanar/multisequence MRI of the left shoulder was performed without the use of intravenous or intra-articular gadolinium.    COMPARISON:  None    FINDINGS:  Rotator cuff: There is moderate tendinosis with a full-thickness tear involving the near full width of the  supraspinatus tendon just proximal to the insertion with approximately 9 mm of retraction.  Tear likely involves a few anterior fibers of the infraspinatus tendon.  There is mild-to-moderate tendinosis of the subscapularis without definite tear.  Teres minor tendon is intact.  Rotator cuff muscle bulk is preserved.    Labrum: Global degenerative changes.    Long head of the biceps: There is suggestion of a possible longitudinal split thickness tear of the extra-articular portion of the long head of the biceps tendon without evidence of retraction.  Intra-articular portion appears intact.    Bones: Marrow signal is within normal limits with the exception of mild cystic change underlying the rotator cuff footprint.  No evidence of fracture or marrow replacement process.    AC joint: Lateral downsloping of the acromion noted.    Cartilage: No large glenohumeral articular cartilage defect demonstrated on this non arthrographic study.  There is suggestion of possible mild degenerative marginal spurring along the inferior aspect of the glenohumeral joint.    Miscellaneous: There is a trace glenohumeral joint effusion.  Mild fluid distention of the subacromial/subdeltoid bursa may be related to underlying rotator cuff pathology and/or bursitis.      Impression       1. Large full-thickness tear of the supraspinatus as above.  2. Mild to moderate tendinosis of the subscapularis tendon without definite tear.  3. Possible split thickness tear of the extra-articular portion of the long head of the biceps tendon without evidence of retraction.  4. Findings suggesting subacromial/subdeltoid bursitis.  5. Lateral downsloping of the acromion noted      Electronically signed by: Salas Shaw MD  Date: 09/05/2018  Time: 16:44            Assessment:     Encounter Diagnosis   Name Primary?    Status post arthroscopy of left shoulder Yes        Plan:     1. D/c sling    2. PT/OT    3. F/up 6wk

## 2018-12-03 ENCOUNTER — TELEPHONE (OUTPATIENT)
Dept: ORTHOPEDICS | Facility: CLINIC | Age: 78
End: 2018-12-03

## 2018-12-03 NOTE — TELEPHONE ENCOUNTER
Attempted to contact [facility] for more info before speaking with provider.  was only able to leave a message for her to return my call. GAMA KEEN.    ----- Message from Matt Perez sent at 12/3/2018  2:11 PM CST -----  Contact: [facility]  She is calling in regards to the protocol  Pt's tendonitis, please advise  [phone] # and fax#[phone]

## 2018-12-03 NOTE — TELEPHONE ENCOUNTER
Attempted to call [facility] multiple times, but unable to reach. Fax number is below. Message sent to Dr. Hall to attempt to get protocol to send to them.     ----- Message from Matt Perez sent at 12/3/2018  2:47 PM CST -----  Contact: [facility]  She is returning a missed call concerning the pt's protocol for tendonitis, please advise [phone] fax#

## 2018-12-12 ENCOUNTER — TELEPHONE (OUTPATIENT)
Dept: ORTHOPEDICS | Facility: CLINIC | Age: 78
End: 2018-12-12

## 2018-12-12 NOTE — TELEPHONE ENCOUNTER
Faxed op note, and circled notation stating as follows:  Postop plan for the patient is to follow the massive size rotator cuff repair protocol with no therapy for 6-8 weeks. AL, LPN

## 2019-01-16 ENCOUNTER — OFFICE VISIT (OUTPATIENT)
Dept: ORTHOPEDICS | Facility: CLINIC | Age: 79
End: 2019-01-16
Payer: MEDICARE

## 2019-01-16 VITALS
HEIGHT: 63 IN | DIASTOLIC BLOOD PRESSURE: 65 MMHG | HEART RATE: 73 BPM | BODY MASS INDEX: 22.5 KG/M2 | SYSTOLIC BLOOD PRESSURE: 115 MMHG | WEIGHT: 127 LBS

## 2019-01-16 DIAGNOSIS — M75.122 COMPLETE TEAR OF LEFT ROTATOR CUFF: Primary | ICD-10-CM

## 2019-01-16 PROCEDURE — 99213 OFFICE O/P EST LOW 20 MIN: CPT | Mod: PBBFAC,PN | Performed by: ORTHOPAEDIC SURGERY

## 2019-01-16 PROCEDURE — 99999 PR PBB SHADOW E&M-EST. PATIENT-LVL III: ICD-10-PCS | Mod: PBBFAC,,, | Performed by: ORTHOPAEDIC SURGERY

## 2019-01-16 PROCEDURE — 99024 PR POST-OP FOLLOW-UP VISIT: ICD-10-PCS | Mod: POP,,, | Performed by: ORTHOPAEDIC SURGERY

## 2019-01-16 PROCEDURE — 99999 PR PBB SHADOW E&M-EST. PATIENT-LVL III: CPT | Mod: PBBFAC,,, | Performed by: ORTHOPAEDIC SURGERY

## 2019-01-16 PROCEDURE — 99024 POSTOP FOLLOW-UP VISIT: CPT | Mod: POP,,, | Performed by: ORTHOPAEDIC SURGERY

## 2019-01-16 NOTE — PROGRESS NOTES
Subjective:     Patient ID: Daniel Jones is a 78 y.o. female.    Chief Complaint: Pain and Follow-up of the Left Shoulder    Doing well, pain improved. PRogressing in PT.    Date of Surgery:  10/18/2018     PREOPERATIVE DIAGNOSES:   1. Left shoulder massive rotator cuff tear.   2. Left shoulder biceps tendinopathy     POSTOPERATIVE DIAGNOSES:   1. Left shoulder massive rotator cuff tear.   2. Left shoulder biceps tendinopathy  3. Left shoulder labral tear/degeneration     PROCEDURE:   1. Left shoulder arthroscopic rotator cuff repair (massive / complex).   2. Left shoulder arthroscopic subacromial decompression.   3. Left shoulder arthroscopic biceps tenodesis (auto)     SURGEON: Cristofer Hall M.D.       Shoulder Pain    The pain is present in the left shoulder. This is a new problem. The current episode started more than 1 month ago. The problem occurs intermittently. The problem has been gradually worsening. The quality of the pain is described as sharp. The pain is at a severity of 0/10. Associated symptoms include stiffness. Pertinent negatives include no fever or numbness. The symptoms are aggravated by activity. She has tried rest and OTC pain meds for the symptoms. The treatment provided significant relief. Physical therapy was not tried.      Past Medical History:   Diagnosis Date    Arthritis     General anesthetics causing adverse effect in therapeutic use     Hypertension     Thyroid disease      Past Surgical History:   Procedure Laterality Date    ARTHROSCOPY, SHOULDER, WITH SUBACROMIAL SPACE DECOMPRESSION Left 10/18/2018    [facility] OR    DEBRIDEMENT Left 10/18/2018    [facility] by [facility] at [facility] OR    DILATION AND CURETTAGE OF UTERUS      REPAIR, ROTATOR CUFF, ARTHROSCOPIC Left 10/18/2018    [facility] by [facility] at [facility] OR    TENODESIS ARTHROSCOPIC (TENDON FIXATION) Left 10/18/2018    [facility]er DURAN  [facility]    tonsilectomy N/A      Family History   Problem Relation Age of Onset    Cancer Mother     Heart failure Father     Hypertension Father     Diabetes Father     Diabetes Maternal Grandmother     Diabetes Paternal Grandmother      Social History     Socioeconomic History    Marital status:      Spouse name: Not on file    Number of children: Not on file    Years of education: Not on file    Highest education level: Not on file   Social Needs    Financial resource strain: Not on file    Food insecurity - worry: Not on file    Food insecurity - inability: Not on file    Transportation needs - medical: Not on file    Transportation needs - non-medical: Not on file   Occupational History    Not on file   Tobacco Use    Smoking status: Former Smoker     Types: Cigarettes     Last attempt to quit: 1970     Years since quittin.0    Smokeless tobacco: Never Used   Substance and Sexual Activity    Alcohol use: No    Drug use: No    Sexual activity: No   Other Topics Concern    Not on file   Social History Narrative    Not on file        Medication List           Accurate as of 19  9:13 AM. If you have any questions, ask your nurse or doctor.               CHANGE how you take these medications    ipratropium 0.02 % nebulizer solution  Commonly known as:  ATROVENT  Take 2.5 mLs (500 mcg total) by nebulization 4 (four) times daily.  What changed:  when to take this     montelukast 10 mg tablet  Commonly known as:  SINGULAIR  Take 1 tablet (10 mg total) by mouth once daily.  What changed:  when to take this        CONTINUE taking these medications    alendronate 70 MG tablet  Commonly known as:  FOSAMAX     * amitriptyline 10 MG tablet  Commonly known as:  ELAVIL     * amitriptyline 10 MG tablet  Commonly known as:  ELAVIL     amLODIPine 5 MG tablet  Commonly known as:  NORVASC     aspirin 325 MG EC tablet  Commonly known as:  ECOTRIN     CO Q-10 100 mg  capsule  Generic drug:  coenzyme Q10     * esomeprazole 40 MG capsule  Commonly known as:  NEXIUM     * esomeprazole 40 MG capsule  Commonly known as:  NEXIUM     etodolac 400 MG tablet  Commonly known as:  LODINE     FLUAD 7781-1795 (65 YR UP)(PF) 45 mcg (15 mcg x 3)/0.5 mL Syrg  Generic drug:  flu vac-2017 [id number](PF)     fluocinonide 0.05% 0.05 % cream  Commonly known as:  LIDEX     * FLUZONE HIGH-DOSE 2018-19 (PF) 180 mcg/0.5 mL vaccine  Generic drug:  influenza     * FLUZONE HIGH-DOSE 2018-19 (PF) 180 mcg/0.5 mL vaccine  Generic drug:  influenza     HYDROcodone-acetaminophen 5-325 mg per tablet  Commonly known as:  NORCO  Take 1 tablet by mouth every 6 (six) hours as needed for Pain.     levothyroxine 88 MCG tablet  Commonly known as:  SYNTHROID     losartan 100 MG tablet  Commonly known as:  COZAAR     ondansetron 4 MG Tbdl  Commonly known as:  ZOFRAN-ODT  Take 1 tablet (4 mg total) by mouth every 8 (eight) hours as needed.     rosuvastatin 5 MG tablet  Commonly known as:  CRESTOR     VIOS AEROSOL DELIVERY SYSTEM Abby  Generic drug:  nebulizer and compressor         * This list has 6 medication(s) that are the same as other medications prescribed for you. Read the directions carefully, and ask your doctor or other care provider to review them with you.              Review of patient's allergies indicates:   Allergen Reactions    Sulfamethoxazole-trimethoprim Hives     Other reaction(s): Hives    Codeine      Can't tolerated oral form of codeine     Review of Systems   Constitution: Negative for fever and night sweats.   HENT: Positive for hearing loss.    Eyes: Negative for blurred vision and visual disturbance.   Cardiovascular: Negative for chest pain and leg swelling.   Respiratory: Negative for shortness of breath.    Endocrine: Negative for polyuria.   Hematologic/Lymphatic: Negative for bleeding problem.   Skin: Negative for rash.   Musculoskeletal: Positive for joint pain and stiffness. Negative  for back pain, joint swelling, muscle cramps and muscle weakness.   Gastrointestinal: Negative for melena.   Genitourinary: Negative for hematuria.   Neurological: Negative for loss of balance, numbness and paresthesias.   Psychiatric/Behavioral: Negative for altered mental status.       Objective:   Body mass index is 22.5 kg/m².  Vitals:    01/16/19 0814   BP: 115/65   Pulse: 73       General: Daniel is well-developed, well-nourished, appears stated age, in no acute distress, alert and oriented to time, place and person.                   Left Shoulder Exam     Other   Sensation: normal     Comments:  FF 0-150  ER 45  IR lumbar        Vascular Exam       Capillary Refill  Left Hand: normal capillary refill    Salas Shaw MD 9/5/2018       Narrative     EXAMINATION:  MRI SHOULDER WITHOUT CONTRAST LEFT    CLINICAL HISTORY:  Arthritis, shoulder;Shoulder pain, prior xray, bursitis / tenosynovitis suspected;  Primary osteoarthritis, left shoulder    TECHNIQUE:  Multiplanar/multisequence MRI of the left shoulder was performed without the use of intravenous or intra-articular gadolinium.    COMPARISON:  None    FINDINGS:  Rotator cuff: There is moderate tendinosis with a full-thickness tear involving the near full width of the supraspinatus tendon just proximal to the insertion with approximately 9 mm of retraction.  Tear likely involves a few anterior fibers of the infraspinatus tendon.  There is mild-to-moderate tendinosis of the subscapularis without definite tear.  Teres minor tendon is intact.  Rotator cuff muscle bulk is preserved.    Labrum: Global degenerative changes.    Long head of the biceps: There is suggestion of a possible longitudinal split thickness tear of the extra-articular portion of the long head of the biceps tendon without evidence of retraction.  Intra-articular portion appears intact.    Bones: Marrow signal is within normal limits with the exception of mild cystic change underlying the  rotator cuff footprint.  No evidence of fracture or marrow replacement process.    AC joint: Lateral downsloping of the acromion noted.    Cartilage: No large glenohumeral articular cartilage defect demonstrated on this non arthrographic study.  There is suggestion of possible mild degenerative marginal spurring along the inferior aspect of the glenohumeral joint.    Miscellaneous: There is a trace glenohumeral joint effusion.  Mild fluid distention of the subacromial/subdeltoid bursa may be related to underlying rotator cuff pathology and/or bursitis.      Impression       1. Large full-thickness tear of the supraspinatus as above.  2. Mild to moderate tendinosis of the subscapularis tendon without definite tear.  3. Possible split thickness tear of the extra-articular portion of the long head of the biceps tendon without evidence of retraction.  4. Findings suggesting subacromial/subdeltoid bursitis.  5. Lateral downsloping of the acromion noted      Electronically signed by: Salas Shaw MD  Date: 09/05/2018  Time: 16:44            Assessment:     Encounter Diagnosis   Name Primary?    Complete tear of left rotator cuff Yes        Plan:     Doing well  Continue PT, motion/strengthening  F/up 3mo

## 2019-02-06 ENCOUNTER — TELEPHONE (OUTPATIENT)
Dept: ORTHOPEDICS | Facility: CLINIC | Age: 79
End: 2019-02-06

## 2019-02-06 NOTE — TELEPHONE ENCOUNTER
Spoke with Addie the patient's PT. States that she was doing a re-eval on the patient today and noticed a bicep bump and was wondering if she had ruined the tenodesis. Reviewed op report and informed her that we did a tenotomy on the biceps and not a tenodesis so there is no anchor for the biceps. She verbalized her understanding and said she just wanted to make us aware of it. Stated the patient is not having any pain and has good range of motion and is doing well with PT.    ----- Message from Matt Perez sent at 2/6/2019  3:49 PM CST -----  Contact: [facility]  She is calling in regards to clinical findings that were concerning for th ept and needs immediate instruction,please advise [phone]

## 2019-03-21 ENCOUNTER — TELEPHONE (OUTPATIENT)
Dept: PULMONOLOGY | Facility: CLINIC | Age: 79
End: 2019-03-21

## 2019-03-21 DIAGNOSIS — R06.02 SOB (SHORTNESS OF BREATH): Primary | ICD-10-CM

## 2019-03-21 NOTE — TELEPHONE ENCOUNTER
----- Message from Rachel Solis sent at 3/21/2019 10:19 AM CDT -----  Contact: pt  Type:  Sooner Apoointment Request    Caller is requesting a sooner appointment.  Caller declined first available appointment listed below.  Caller will not accept being placed on the waitlist and is requesting a message be sent to doctor.  Name of Caller:Patient  When is the first available appointment?4/4  Symptoms:pt states she having surgery and need to come in before  Would the patient rather a call back or a response via [facility]? Call back  Best Call Back Number:[phone] or [phone]  Additional Information: na

## 2019-03-21 NOTE — TELEPHONE ENCOUNTER
Pt accepted [facility] appointment on 3/27/2019 at 0940 with Dr Yadav.  Pt provided times for all testing appointments.  Pt verbalized understanding.

## 2019-03-22 ENCOUNTER — CLINICAL SUPPORT (OUTPATIENT)
Dept: PULMONOLOGY | Facility: CLINIC | Age: 79
End: 2019-03-22
Payer: MEDICARE

## 2019-03-22 ENCOUNTER — HOSPITAL ENCOUNTER (OUTPATIENT)
Dept: RADIOLOGY | Facility: HOSPITAL | Age: 79
Discharge: HOME | End: 2019-03-22
Attending: INTERNAL MEDICINE
Payer: MEDICARE

## 2019-03-22 DIAGNOSIS — J47.9 BRONCHIECTASIS WITHOUT COMPLICATION: ICD-10-CM

## 2019-03-22 DIAGNOSIS — R06.02 SOB (SHORTNESS OF BREATH): ICD-10-CM

## 2019-03-22 LAB
ALLENS TEST: ABNORMAL
BRPFT: NORMAL
DELSYS: ABNORMAL
FEF 25 75 CHG: 19.9 %
FEF 25 75 LLN: 0.66
FEF 25 75 POST REF: 65.8 %
FEF 25 75 PRE REF: 54.9 %
FEF 25 75 REF: 1.54
FET100 CHG: -6.5 %
FEV1 CHG: 6.1 %
FEV1 FVC CHG: 4.4 %
FEV1 FVC LLN: 63
FEV1 FVC POST REF: 89.9 %
FEV1 FVC PRE REF: 86.1 %
FEV1 FVC REF: 78
FEV1 LLN: 1.29
FEV1 POST REF: 92.1 %
FEV1 PRE REF: 86.8 %
FEV1 REF: 1.82
FEV6 CHG: 3.2 %
FEV6 LLN: 1.67
FEV6 POST REF: 100.7 %
FEV6 POST: 2.31 L (ref 1.67–2.91)
FEV6 PRE REF: 97.6 %
FEV6 PRE: 2.23 L (ref 1.67–2.91)
FEV6 REF: 2.29
FIO2: 21
FVC CHG: 1.6 %
FVC LLN: 1.68
FVC POST REF: 101.5 %
FVC PRE REF: 99.9 %
FVC REF: 2.37
HCO3 UR-SCNC: 26.5 MMOL/L (ref 24–28)
MODE: ABNORMAL
PCO2 BLDA: 45.5 MMHG (ref 35–45)
PEF CHG: 2.1 %
PEF LLN: 3.16
PEF POST REF: 91.4 %
PEF PRE REF: 89.5 %
PEF REF: 4.71
PH SMN: 7.37 [PH] (ref 7.35–7.45)
PO2 BLDA: 69 MMHG (ref 80–100)
POC BE: 1 MMOL/L
POC SATURATED O2: 93 % (ref 95–100)
POST FEF 25 75: 1.01 L/S (ref 0.66–2.42)
POST FET 100: 10.13 SEC
POST FEV1 FVC: 69.88 % (ref 63.46–92.02)
POST FEV1: 1.68 L (ref 1.29–2.35)
POST FVC: 2.4 L (ref 1.68–3.05)
POST PEF: 4.3 L/S (ref 3.16–6.27)
PRE FEF 25 75: 0.85 L/S (ref 0.66–2.42)
PRE FET 100: 10.83 SEC
PRE FEV1 FVC: 66.93 % (ref 63.46–92.02)
PRE FEV1: 1.58 L (ref 1.29–2.35)
PRE FVC: 2.37 L (ref 1.68–3.05)
PRE PEF: 4.22 L/S (ref 3.16–6.27)
SAMPLE: ABNORMAL
SITE: ABNORMAL

## 2019-03-22 PROCEDURE — 36600 PR WITHDRAWAL OF ARTERIAL BLOOD: ICD-10-PCS | Mod: 59,S$PBB,, | Performed by: INTERNAL MEDICINE

## 2019-03-22 PROCEDURE — 71046 X-RAY EXAM CHEST 2 VIEWS: CPT | Mod: TC

## 2019-03-22 PROCEDURE — 71046 X-RAY EXAM CHEST 2 VIEWS: CPT | Mod: 26,,, | Performed by: RADIOLOGY

## 2019-03-22 PROCEDURE — 71046 XR CHEST PA AND LATERAL: ICD-10-PCS | Mod: 26,,, | Performed by: RADIOLOGY

## 2019-03-22 PROCEDURE — 94060 PR EVAL OF BRONCHOSPASM: ICD-10-PCS | Mod: 26,S$PBB,, | Performed by: INTERNAL MEDICINE

## 2019-03-22 PROCEDURE — 36600 WITHDRAWAL OF ARTERIAL BLOOD: CPT | Mod: PBBFAC,PN

## 2019-03-22 PROCEDURE — 82803 BLOOD GASES ANY COMBINATION: CPT | Mod: PBBFAC,PN

## 2019-03-22 PROCEDURE — 94060 EVALUATION OF WHEEZING: CPT | Mod: 26,S$PBB,, | Performed by: INTERNAL MEDICINE

## 2019-03-22 PROCEDURE — 36600 WITHDRAWAL OF ARTERIAL BLOOD: CPT | Mod: 59,S$PBB,, | Performed by: INTERNAL MEDICINE

## 2019-03-22 PROCEDURE — 94060 EVALUATION OF WHEEZING: CPT | Mod: PBBFAC,PN

## 2019-03-22 NOTE — PROCEDURES
See ABG results    REPORT    Chronic (compensated) Acute (uncompensated) primary respiratory acidosis    pH < 7.36 and HCO3 < 25, for acute (uncompensated)  pH > 7.39 and HCO3 > 26, for chronic (compensated)    expected pH = 7.38  expected CO2 = 46  expected HCO3- = 25    Homero Strong MD

## 2019-03-27 ENCOUNTER — OFFICE VISIT (OUTPATIENT)
Dept: PULMONOLOGY | Facility: CLINIC | Age: 79
End: 2019-03-27
Payer: MEDICARE

## 2019-03-27 VITALS
OXYGEN SATURATION: 96 % | WEIGHT: 129.63 LBS | HEART RATE: 78 BPM | HEIGHT: 66 IN | RESPIRATION RATE: 16 BRPM | SYSTOLIC BLOOD PRESSURE: 126 MMHG | BODY MASS INDEX: 20.83 KG/M2 | DIASTOLIC BLOOD PRESSURE: 74 MMHG

## 2019-03-27 DIAGNOSIS — Z01.811 PRE-OPERATIVE RESPIRATORY EXAMINATION: Primary | ICD-10-CM

## 2019-03-27 DIAGNOSIS — A31.0 MAIC (MYCOBACTERIUM AVIUM-INTRACELLULARE COMPLEX): ICD-10-CM

## 2019-03-27 DIAGNOSIS — J47.9 BRONCHIECTASIS WITHOUT COMPLICATION: ICD-10-CM

## 2019-03-27 DIAGNOSIS — J41.0 SIMPLE CHRONIC BRONCHITIS: ICD-10-CM

## 2019-03-27 DIAGNOSIS — I10 ESSENTIAL HYPERTENSION: ICD-10-CM

## 2019-03-27 PROCEDURE — 99215 PR OFFICE/OUTPT VISIT, EST, LEVL V, 40-54 MIN: ICD-10-PCS | Mod: 25,S$PBB,, | Performed by: INTERNAL MEDICINE

## 2019-03-27 PROCEDURE — 99999 PR PBB SHADOW E&M-EST. PATIENT-LVL IV: ICD-10-PCS | Mod: PBBFAC,,, | Performed by: INTERNAL MEDICINE

## 2019-03-27 PROCEDURE — 99215 OFFICE O/P EST HI 40 MIN: CPT | Mod: 25,S$PBB,, | Performed by: INTERNAL MEDICINE

## 2019-03-27 PROCEDURE — 99999 PR PBB SHADOW E&M-EST. PATIENT-LVL IV: CPT | Mod: PBBFAC,,, | Performed by: INTERNAL MEDICINE

## 2019-03-27 PROCEDURE — 99214 OFFICE O/P EST MOD 30 MIN: CPT | Mod: PBBFAC,PN | Performed by: INTERNAL MEDICINE

## 2019-03-27 RX ORDER — ALBUTEROL SULFATE 0.83 MG/ML
SOLUTION RESPIRATORY (INHALATION)
COMMUNITY
End: 2021-08-11 | Stop reason: SDUPTHER

## 2019-03-27 RX ORDER — CETIRIZINE HYDROCHLORIDE, PSEUDOEPHEDRINE HYDROCHLORIDE 5; 120 MG/1; MG/1
TABLET, FILM COATED, EXTENDED RELEASE ORAL
COMMUNITY
End: 2019-03-27 | Stop reason: SINTOL

## 2019-03-27 RX ORDER — LEVOTHYROXINE SODIUM 100 UG/1
TABLET ORAL
COMMUNITY
End: 2019-04-17

## 2019-03-27 RX ORDER — AMITRIPTYLINE HYDROCHLORIDE 10 MG/1
TABLET, FILM COATED ORAL
COMMUNITY
End: 2019-04-17

## 2019-03-27 RX ORDER — CETIRIZINE HYDROCHLORIDE 10 MG/1
10 TABLET ORAL
COMMUNITY

## 2019-03-27 NOTE — PROGRESS NOTES
Subjective:       Patient ID: Daniel Jones is a 78 y.o. female.    Chief Complaint: She       No chief complaint on file.    HPI     Dyspnea  Patient complains of shortness of breath. Symptoms occur after more than one flight stairs. Symptoms began 2 years ago, gradually improving since. Associated symptoms include  drainage from nose, dyspnea on exertion and post nasal drip. She denies chest pain, located left chest. She does not have had recent travel. Weight has been stable. Symptoms are exacerbated by strenuous activity. Symptoms are alleviated by rest.       Bronchiectasis  She presents for evaluation and treatment of bronchiectasis. The patient was diagnosed with bronchiectasis on 8/1/2017 on the basis of a CT scan. The bronchiectasis was localized to the lingula and localized to the right middle lobe. The patient has a history of atypical mycobacterium infection, first diagnosed 8/2017 at [facility] . Prior testing has included pulmonary function testing and sputum studies for fungus and mycobacteria which revealed evidence of bronchiectesis. The patient is not having constitutional symptoms, including weight loss. There evidence of MAIC history of pneumonia, and the patient has not been hospitalized for this condition before. She has a history of 5 pack years of smoking.The patient has no exercise limitations. Prior treatments have included antibiotics for MAIC, which have provided minimal relief of symptoms.     Past Medical History:   Diagnosis Date    Arthritis     General anesthetics causing adverse effect in therapeutic use     Hypertension     Thyroid disease      Past Surgical History:   Procedure Laterality Date    ARTHROSCOPY, SHOULDER, WITH SUBACROMIAL SPACE DECOMPRESSION Left 10/18/2018    [facility]    DEBRIDEMENT Left 10/18/2018    [facility] at [facility]    DILATION AND CURETTAGE OF UTERUS       REPAIR, ROTATOR CUFF, ARTHROSCOPIC Left 10/18/2018    [facility] [place]    TENODESIS ARTHROSCOPIC (TENDON FIXATION) Left 10/18/2018    Performed by [facility] at [facility] [place]    tonsilectomy N/A      Social History     Socioeconomic History    Marital status:      Spouse name: Not on file    Number of children: Not on file    Years of education: Not on file    Highest education level: Not on file   Occupational History    Not on file   Social Needs    Financial resource strain: Not on file    Food insecurity:     Worry: Not on file     Inability: Not on file    Transportation needs:     Medical: Not on file     Non-medical: Not on file   Tobacco Use    Smoking status: Former Smoker     Types: Cigarettes     Last attempt to quit: 1970     Years since quittin.2    Smokeless tobacco: Never Used   Substance and Sexual Activity    Alcohol use: No    Drug use: No    Sexual activity: Never   Lifestyle    Physical activity:     Days per week: Not on file     Minutes per session: Not on file    Stress: Not on file   Relationships    Social connections:     Talks on phone: Not on file     Gets together: Not on file     Attends Hinduism service: Not on file     Active member of club or organization: Not on file     Attends meetings of clubs or organizations: Not on file     Relationship status: Not on file    Intimate partner violence:     Fear of current or ex partner: Not on file     Emotionally abused: Not on file     Physically abused: Not on file     Forced sexual activity: Not on file   Other Topics Concern    Not on file   Social History Narrative    Not on file     Review of Systems   Constitutional: Positive for fatigue. Negative for fever.   HENT: Positive for postnasal drip, rhinorrhea and congestion.    Eyes: Negative for redness and itching.   Respiratory: Positive for cough, sputum production, shortness of breath, dyspnea on extertion,  use of rescue inhaler and Paroxysmal Nocturnal Dyspnea.    Cardiovascular: Negative for chest pain, palpitations and leg swelling.   Genitourinary: Negative for difficulty urinating and hematuria.   Endocrine: Negative for cold intolerance and heat intolerance.    Skin: Negative for rash.   Gastrointestinal: Negative for nausea and abdominal pain.   Neurological: Negative for dizziness, syncope, weakness and light-headedness.   Hematological: Negative for adenopathy. Does not bruise/bleed easily.   Psychiatric/Behavioral: Negative for sleep disturbance. The patient is not nervous/anxious.        Objective:      Physical Exam   Constitutional: She is oriented to person, place, and time. She appears well-developed and well-nourished.   HENT:   Head: Normocephalic and atraumatic.   Mouth/Throat: Oropharyngeal exudate present.   Eyes: Pupils are equal, round, and reactive to light. Conjunctivae are normal.   Neck: Neck supple. No JVD present. No tracheal deviation present. No thyromegaly present.   Cardiovascular: Normal rate, regular rhythm and normal heart sounds.   Pulmonary/Chest: Effort normal. No respiratory distress. She has decreased breath sounds. She has wheezes in the right lower field and the left lower field. She has no rhonchi. She has no rales. She exhibits no tenderness.   Abdominal: Soft. Bowel sounds are normal.   Musculoskeletal: Normal range of motion. She exhibits no edema.   Lymphadenopathy:     She has no cervical adenopathy.   Neurological: She is alert and oriented to person, place, and time.   Skin: Skin is warm and dry.   Nursing note and vitals reviewed.    Personal Diagnostic Review  Chest x-ray: stable bilateral lower lobe scarring      X-Ray Chest PA And Lateral  Narrative: EXAMINATION:  XR CHEST PA AND LATERAL    CLINICAL HISTORY:  SOB; Bronchiectasis, uncomplicated    TECHNIQUE:  PA and lateral views of the chest were performed.    COMPARISON:  October 1, 2018    FINDINGS:  Increased  prominence findings within the left base.  Linear areas of scarring again noted and nodular pleural thickening appears more pronounced than noted on previous study.  Consider CT for further evaluation.  Remainder of the chest is stable in appearance.  Heart size within normal limits and trachea midline.  CP angles are clear.  Osseous structures intact with generalized osteopenia and spondylosis noted.  Impression: Interval increased prominence findings within the left base.  CT recommended for further evaluation.    Electronically signed by: Edgar Rubalcava MD  Date:    2019  Time:    12:22    Radiology Result      Name:   :  Patient MRN:   Daniel Jones 1940 [id number]   Account Number: Room & Bed Accession Number:   [id number]   [id number]   Authorizing Physician: Patient Class: Diagnosis:   José Miguel Yadav OP- Outpatient Diagnostic Testing Acute chest pain [R07.9 (ICD-10-CM)]  Other acute pulmonary embolism without acute cor pulmonale [I26.99 (ICD-10-CM)]   Procedure:  Exam Date: Reason for Exam:   CTA Chest Non-Coronary 2018 Chest pain, acute, PE suspected, low pretest prob             RESULTS:     EXAMINATION:  CTA CHEST NON CORONARY     CLINICAL HISTORY:  Chest pain, acute, PE suspected, low pretest prob;Chest pain, unspecified     TECHNIQUE:  CT of the chest was acquired helically utilizing a low-dose technique from   the lung apices through the posterior costophrenic angles status post   administration of 100 cc of Omnipaque 350.  Bolus timing was utilized to   optimize opacification of the pulmonary arterial system. 3-D maximum   intensity projection and multiplanar reconstructions were created from the   source data set and interpreted.     COMPARISON:  Chest x-ray from 2018     FINDINGS:  No infiltrates or pleural effusions are identified.  There are some   bronchiectatic changes noted within the right middle lobe, lingula and the   bilateral lower lobes left greater than the right.   There also appear to   be some minimal scarring changes noted within either lung base.  There   appears to be partial opacification of a couple of the dilated bronchi   distally within the medial aspect of the lingula.     The aorta demonstrates normal caliber and contour. There is good   opacification of the pulmonary arterial system. No intraluminal filling   defects are notified within the pulmonary arterial system to suggest   pulmonary embolism. There is no pericardial effusion.  No enlarged   mediastinal, hilar or axillary lymph nodes are identified.     There are a couple of calcified granulomas noted within the right hepatic   lobe.     The osseous structures are unremarkable in appearance.     IMPRESSION:      1. No evidence to suggest pulmonary embolism.     2.  Bronchiectasis noted within the right middle lobe lingula and   bilateral lower lobes.  Minimal scarring changes also present within the   bilateral lung bases..        Electronically signed by:     Timothy Elam DO  Date:                                    04/26/2018  Time:                                   15:18                    Signed By:  Timothy Elam DO on 4/26/2018  3:18 PM       Results for KAROLYN LOVE (MRN [id number]) as of 3/27/2019 10:32   Ref. Range 3/22/2019 11:13   POC PH Latest Ref Range: 7.35 - 7.45  7.372   POC PCO2 Latest Ref Range: 35 - 45 mmHg 45.5 (H)   POC PO2 Latest Ref Range: 80 - 100 mmHg 69 (L)   POC BE Latest Ref Range: -2 to 2 mmol/L 1   POC HCO3 Latest Ref Range: 24 - 28 mmol/L 26.5   POC SATURATED O2 Latest Ref Range: 95 - 100 % 93 (L)   FiO2 Unknown 21   Sample Unknown ARTERIAL   DelSys Unknown Room Air   Allens Test Unknown Pass   Site Unknown RR   Mode Unknown SPONT     Office Spirometry Results:Normal spirometry. (FEV1/VC greater than or equal to LLN and FVC greater than or equal to LLN)  Airflow is not clearly improved after bronchodilator. Clinical improvement following bronchodilator therapy may occur  in the  absence of spirometric improvement.  (Physician Final: 03/22/2019 02:10PM, Electronically signed by Dr. José Miguel Yadav)     No flowsheet data found.  Pulmonary Studies Review 3/27/2019   SpO2 96   Height 66.000   Weight 2074.09   BMI (Calculated) 21   Predicted Distance 303.88   Predicted Distance Meters (Calculated) 446.7         Assessment:       1. Pre-operative respiratory examination    2. Essential hypertension    3. Simple chronic bronchitis    4. MAIC (mycobacterium avium-intracellulare complex)    5. Bronchiectasis without complication        Outpatient Encounter Medications as of 3/27/2019   Medication Sig Dispense Refill    albuterol (PROVENTIL) 2.5 mg /3 mL (0.083 %) nebulizer solution albuterol sulfate 2.5 mg/3 mL (0.083 %) solution for nebulization   VVN Q 6 H WHILE AWAKE      alendronate (FOSAMAX) 70 MG tablet Take 70 mg by mouth.      amitriptyline (ELAVIL) 10 MG tablet Take 10 mg by mouth nightly.      amitriptyline (ELAVIL) 10 MG tablet Take 20 mg by mouth.      amLODIPine (NORVASC) 5 MG tablet TAKE 1 TABLET DAILY      aspirin (ECOTRIN) 325 MG EC tablet Take 325 mg by mouth nightly.      cetirizine (ZYRTEC) 10 MG tablet Take 10 mg by mouth.      coenzyme Q10 (CO Q-10) 100 mg capsule Take 100 mg by mouth once daily.      esomeprazole (NEXIUM) 40 MG capsule Take 40 mg by mouth before breakfast.      esomeprazole (NEXIUM) 40 MG capsule Take 40 mg by mouth.      etodolac (LODINE) 400 MG tablet Take 400 mg by mouth.      flu vac-2017 [id number],PF, (FLUAD 3301-5370, 65 YR UP,,PF,) 45 mcg (15 mcg x 3)/0.5 mL Syrg 0.5 mLs.      fluocinonide 0.05% (LIDEX) 0.05 % cream LEODAN 1 APPLICATION ON THE SKIN BID      ipratropium (ATROVENT) 0.02 % nebulizer solution Take 2.5 mLs (500 mcg total) by nebulization 4 (four) times daily. (Patient taking differently: Take 500 mcg by nebulization every evening. ) 900 mL 3    levothyroxine (SYNTHROID) 100 MCG tablet Synthroid      levothyroxine  (SYNTHROID) 88 MCG tablet Take 88 mcg by mouth every evening.       losartan (COZAAR) 100 MG tablet Take 100 mg by mouth every evening.       montelukast (SINGULAIR) 10 mg tablet Take 1 tablet (10 mg total) by mouth once daily. (Patient taking differently: Take 10 mg by mouth every morning. ) 90 tablet 4    ondansetron (ZOFRAN-ODT) 4 MG TbDL Take 1 tablet (4 mg total) by mouth every 8 (eight) hours as needed. 30 tablet 0    rosuvastatin (CRESTOR) 5 MG tablet Take 5 mg by mouth every evening.       [employer] AEROSOL DELIVERY SYSTEM Abby USE 1 VIAL NEBULIZED Q 6 H WHILE AWAKE  0    amitriptyline (ELAVIL) 10 MG tablet amitriptyline      FLUZONE HIGH-DOSE 2018-19, PF, 180 mcg/0.5 mL vaccine ADM 0.5ML IM UTD  0    HYDROcodone-acetaminophen (NORCO) 5-325 mg per tablet Take 1 tablet by mouth every 6 (six) hours as needed for Pain. 40 tablet 0    influenza (FLUZONE HIGH-DOSE 2018-19, PF,) 180 mcg/0.5 mL vaccine ADM 0.5ML IM UTD      [DISCONTINUED] cetirizine-pseudoephedrine (ZYRTEC-D) 5-120 mg Tb12 Zyrtec-D       No facility-administered encounter medications on file as of 3/27/2019.      Orders Placed This Encounter   Procedures    X-Ray Chest PA And Lateral     Standing Status:   Future     Standing Expiration Date:   9/27/2020     Order Specific Question:   Reason for Exam:     Answer:   SOB    Spirometry without Bronchodilator     Standing Status:   Future     Standing Expiration Date:   9/27/2020     Plan:       Requested Prescriptions      No prescriptions requested or ordered in this encounter     Problem List Items Addressed This Visit     Bronchiectasis    Relevant Orders    X-Ray Chest PA And Lateral    Spirometry without Bronchodilator    Chronic obstructive pulmonary disease    Essential hypertension    MAIC (mycobacterium avium-intracellulare complex)      Other Visit Diagnoses     Pre-operative respiratory examination    -  Primary             Follow up in about 1 year (around 3/27/2020) for Review  [place] and CXR.     Clearance for surgery:  The patient is at an increased risk of complications from surgery due to multiple medical problems including COPD. Vaccination status reviewed and updated. Risks of possible complications of surgery discussed in detail including risk of respiratory failure requiring mechanical ventilation, post operative pneumonia, deep venous thrombosis, pulmonary embolism and death.  Pulmonary function studies, arterial blood gases and chest X ray reports are independently reviewed. Methods of improving lung function prior to surgery including incentive spirometry, regular use of bronchodilators, exercise and respiratory toilet discussed. Patient voices understanding of increased risks involved due to compromised pulmonary status and the need to comply with treatment plan prior to surgery.  The patient is cleared for anesthesia and surgery from a pulmonary standpoint.        MEDICAL DECISION MAKING: Moderate to high complexity.  Overall, the multiple problems listed are of moderate to high severity that may impact quality of life and activities of daily living. Side effects of medications, treatment plan as well as options and alternatives reviewed and discussed with patient. There was counseling of patient concerning these issues.    Total time spent in face to face counseling and coordination of care - 45  minutes over 50% of time was used in discussion of prognosis, risks, benefits of treatment, instructions and compliance with regimen . Discussion with other physicians or health care providers (DME, NP, pharmacy, respiratory therapy) occurred.

## 2019-03-27 NOTE — LETTER
March 27, 2019      Anaya Aguilar MD  [address]   [address]  [facility] [address]           [facility]  [address]  [place] [address]  Phone: [phone]  Fax: [phone]          Patient: Daniel Jones   MR Number: [id number]   YOB: 1940   Date of Visit: 3/27/2019           Thank you for referring Daniel Jones to me for evaluation. Attached you will find relevant portions of my assessment and plan of care.    If you have questions, please do not hesitate to call me. I look forward to following Daniel Jones along with you.    Sincerely,    José Miguel Yadav MD    Enclosure  CC:  No Recipients    IIf you would like to receive this communication electronically, please contact [email or web address] or [phone] to request [employer] access.    [employer] is a tool which provides read-only access to select patient information with whom you have a relationship. Its easy to use and provides real time access to review your patients record including encounter summaries, notes, results, and demographic information.    If you feel you have received this communication in error or would no longer like to receive these types of communications, please e-mail [email or web address]

## 2019-04-17 ENCOUNTER — OFFICE VISIT (OUTPATIENT)
Dept: ORTHOPEDICS | Facility: CLINIC | Age: 79
End: 2019-04-17
Payer: MEDICARE

## 2019-04-17 VITALS
WEIGHT: 129.63 LBS | DIASTOLIC BLOOD PRESSURE: 70 MMHG | HEART RATE: 76 BPM | SYSTOLIC BLOOD PRESSURE: 116 MMHG | BODY MASS INDEX: 20.83 KG/M2 | HEIGHT: 66 IN

## 2019-04-17 DIAGNOSIS — M75.122 COMPLETE TEAR OF LEFT ROTATOR CUFF: Primary | ICD-10-CM

## 2019-04-17 PROCEDURE — 99999 PR PBB SHADOW E&M-EST. PATIENT-LVL III: ICD-10-PCS | Mod: PBBFAC,,, | Performed by: ORTHOPAEDIC SURGERY

## 2019-04-17 PROCEDURE — 99213 PR OFFICE/OUTPT VISIT, EST, LEVL III, 20-29 MIN: ICD-10-PCS | Mod: S$PBB,,, | Performed by: ORTHOPAEDIC SURGERY

## 2019-04-17 PROCEDURE — 99213 OFFICE O/P EST LOW 20 MIN: CPT | Mod: PBBFAC,PN | Performed by: ORTHOPAEDIC SURGERY

## 2019-04-17 PROCEDURE — 99999 PR PBB SHADOW E&M-EST. PATIENT-LVL III: CPT | Mod: PBBFAC,,, | Performed by: ORTHOPAEDIC SURGERY

## 2019-04-17 PROCEDURE — 99213 OFFICE O/P EST LOW 20 MIN: CPT | Mod: S$PBB,,, | Performed by: ORTHOPAEDIC SURGERY

## 2019-04-17 RX ORDER — AMITRIPTYLINE HYDROCHLORIDE 10 MG/1
TABLET, FILM COATED ORAL
COMMUNITY
Start: 2019-04-11 | End: 2019-04-17

## 2019-04-17 RX ORDER — AZITHROMYCIN 250 MG/1
TABLET, FILM COATED ORAL
COMMUNITY
Start: 2019-04-15 | End: 2021-08-11

## 2019-04-17 RX ORDER — OLMESARTAN MEDOXOMIL AND HYDROCHLOROTHIAZIDE 40/12.5 40; 12.5 MG/1; MG/1
1 TABLET ORAL DAILY
COMMUNITY

## 2019-04-17 RX ORDER — OLMESARTAN MEDOXOMIL AND HYDROCHLOROTHIAZIDE 40/12.5 40; 12.5 MG/1; MG/1
1 TABLET ORAL
COMMUNITY
Start: 2019-04-15 | End: 2019-04-17

## 2019-04-17 NOTE — PROGRESS NOTES
Subjective:     Patient ID: Daniel Jones is a 78 y.o. female.    Chief Complaint: Pain of the Left Shoulder    Doing well, returned to normal activities    Date of Surgery:  10/18/2018     PREOPERATIVE DIAGNOSES:   1. Left shoulder massive rotator cuff tear.   2. Left shoulder biceps tendinopathy     POSTOPERATIVE DIAGNOSES:   1. Left shoulder massive rotator cuff tear.   2. Left shoulder biceps tendinopathy  3. Left shoulder labral tear/degeneration     PROCEDURE:   1. Left shoulder arthroscopic rotator cuff repair (massive / complex).   2. Left shoulder arthroscopic subacromial decompression.   3. Left shoulder arthroscopic biceps tenodesis (auto)     SURGEON: Cristofer Hall M.D.     Shoulder Pain    The pain is present in the left shoulder. This is a new problem. The current episode started more than 1 month ago. The problem occurs intermittently. The problem has been gradually worsening. The quality of the pain is described as sharp. The pain is at a severity of 0/10. Associated symptoms include stiffness. The symptoms are aggravated by activity. She has tried rest and OTC pain meds for the symptoms. The treatment provided significant relief. Physical therapy was not tried.      Past Medical History:   Diagnosis Date    Arthritis     General anesthetics causing adverse effect in therapeutic use     Hypertension     Thyroid disease      Past Surgical History:   Procedure Laterality Date    ARTHROSCOPY, SHOULDER, WITH SUBACROMIAL SPACE DECOMPRESSION Left 10/18/2018    [facility]    BREAST LUMPECTOMY Right 04/03/2019    DEBRIDEMENT Left 10/18/2018    [facility]    DILATION AND CURETTAGE OF UTERUS      REPAIR, ROTATOR CUFF, ARTHROSCOPIC Left 10/18/2018    [facility]    TENODESIS ARTHROSCOPIC (TENDON FIXATION) Left 10/18/2018    [facility]     tonsilectomy N/A      Family History   Problem Relation Age of Onset    Cancer Mother     Heart failure Father     Hypertension Father     Diabetes Father     Diabetes Maternal Grandmother     Diabetes Paternal Grandmother      Social History     Socioeconomic History    Marital status:      Spouse name: Not on file    Number of children: Not on file    Years of education: Not on file    Highest education level: Not on file   Occupational History    Not on file   Social Needs    Financial resource strain: Not on file    Food insecurity:     Worry: Not on file     Inability: Not on file    Transportation needs:     Medical: Not on file     Non-medical: Not on file   Tobacco Use    Smoking status: Former Smoker     Types: Cigarettes     Last attempt to quit: 1970     Years since quittin.3    Smokeless tobacco: Never Used   Substance and Sexual Activity    Alcohol use: No    Drug use: No    Sexual activity: Never   Lifestyle    Physical activity:     Days per week: Not on file     Minutes per session: Not on file    Stress: Not on file   Relationships    Social connections:     Talks on phone: Not on file     Gets together: Not on file     Attends [employer] service: Not on file     Active member of club or organization: Not on file     Attends meetings of clubs or organizations: Not on file     Relationship status: Not on file   Other Topics Concern    Not on file   Social History Narrative    Not on file     Medication List with Changes/Refills   Current Medications    ALBUTEROL (PROVENTIL) 2.5 MG /3 ML (0.083 %) NEBULIZER SOLUTION    albuterol sulfate 2.5 mg/3 mL (0.083 %) solution for nebulization   VVN Q 6 H WHILE AWAKE    ALENDRONATE (FOSAMAX) 70 MG TABLET    Take 70 mg by mouth.    AMITRIPTYLINE (ELAVIL) 10 MG TABLET    Take 10 mg by mouth nightly.    AMLODIPINE (NORVASC) 5 MG TABLET    TAKE 1 TABLET DAILY    ASPIRIN (ECOTRIN) 325 MG EC TABLET    Take 325 mg by mouth nightly.     AZITHROMYCIN (ZITHROMAX Z-CATIA) 250 MG TABLET    TAKE AS DIRECTED    CETIRIZINE (ZYRTEC) 10 MG TABLET    Take 10 mg by mouth.    COENZYME Q10 (CO Q-10) 100 MG CAPSULE    Take 100 mg by mouth once daily.    ESOMEPRAZOLE (NEXIUM) 40 MG CAPSULE    Take 40 mg by mouth before breakfast.    ETODOLAC (LODINE) 400 MG TABLET    Take 400 mg by mouth.    FLU VAC-2017 [id number],PF, (FLUAD [id number], 65 YR UP,,PF,) 45 MCG (15 MCG X 3)/0.5 ML SYRG    0.5 mLs.    FLUOCINONIDE 0.05% (LIDEX) 0.05 % CREAM    LEODAN 1 APPLICATION ON THE SKIN BID    FLUZONE HIGH-DOSE 2018-19, PF, 180 MCG/0.5 ML VACCINE    ADM 0.5ML IM UTD    HYDROCODONE-ACETAMINOPHEN (NORCO) 5-325 MG PER TABLET    Take 1 tablet by mouth every 6 (six) hours as needed for Pain.    INFLUENZA (FLUZONE HIGH-DOSE 2018-19, PF,) 180 MCG/0.5 ML VACCINE    ADM 0.5ML IM UTD    IPRATROPIUM (ATROVENT) 0.02 % NEBULIZER SOLUTION    Take 2.5 mLs (500 mcg total) by nebulization 4 (four) times daily.    LEVOTHYROXINE (SYNTHROID) 88 MCG TABLET    Take 88 mcg by mouth every evening.     MONTELUKAST (SINGULAIR) 10 MG TABLET    Take 1 tablet (10 mg total) by mouth once daily.    OLMESARTAN-HYDROCHLOROTHIAZIDE (BENICAR HCT) 40-12.5 MG TAB    Take 1 tablet by mouth once daily.    ROSUVASTATIN (CRESTOR) 5 MG TABLET    Take 5 mg by mouth every evening.     [employer] AEROSOL DELIVERY SYSTEM QUIN    USE 1 VIAL NEBULIZED Q 6 H WHILE AWAKE   Discontinued Medications    AMITRIPTYLINE (ELAVIL) 10 MG TABLET    Take 20 mg by mouth.    AMITRIPTYLINE (ELAVIL) 10 MG TABLET    amitriptyline    AMITRIPTYLINE (ELAVIL) 10 MG TABLET    TK 2 TS PO QHS    ESOMEPRAZOLE (NEXIUM) 40 MG CAPSULE    Take 40 mg by mouth.    LEVOTHYROXINE (SYNTHROID) 100 MCG TABLET    Synthroid    LOSARTAN (COZAAR) 100 MG TABLET    Take 100 mg by mouth every evening.     OLMESARTAN-HYDROCHLOROTHIAZIDE (BENICAR HCT) 40-12.5 MG TAB    Take 1 tablet by mouth.    ONDANSETRON (ZOFRAN-ODT) 4 MG TBDL    Take 1 tablet (4 mg total) by mouth every  8 (eight) hours as needed.     Review of patient's allergies indicates:   Allergen Reactions    Sulfamethoxazole-trimethoprim Hives     Other reaction(s): Hives    Codeine      Can't tolerated oral form of codeine     Review of Systems   Constitution: Negative for night sweats.   HENT: Positive for hearing loss.    Eyes: Negative for blurred vision and visual disturbance.   Cardiovascular: Negative for leg swelling.   Respiratory: Negative for shortness of breath.    Endocrine: Negative for polyuria.   Hematologic/Lymphatic: Negative for bleeding problem.   Musculoskeletal: Positive for joint pain and stiffness. Negative for back pain, muscle cramps and muscle weakness.   Gastrointestinal: Negative for melena.   Genitourinary: Negative for hematuria.   Neurological: Negative for loss of balance and paresthesias.   Psychiatric/Behavioral: Negative for altered mental status.       Objective:   Body mass index is 20.92 kg/m².  Vitals:    04/17/19 1347   BP: 116/70   Pulse: 76       General: Daniel is well-developed, well-nourished, appears stated age, in no acute distress, alert and oriented to time, place and person.       General    Vitals reviewed.  Constitutional: She is oriented to person, place, and time. She appears well-developed and well-nourished. No distress.   HENT:   Head: Atraumatic.   Nose: Nose normal.   Eyes: Pupils are equal, round, and reactive to light. Right eye exhibits no discharge. Left eye exhibits no discharge.   Neck: Normal range of motion.   Cardiovascular: Normal rate and intact distal pulses.    Pulmonary/Chest: Effort normal. No respiratory distress.   Neurological: She is alert and oriented to person, place, and time. She has normal reflexes. She displays normal reflexes. No cranial nerve deficit. Coordination normal.   Psychiatric: She has a normal mood and affect. Her behavior is normal. Judgment and thought content normal.         Right Shoulder Exam     Inspection/Observation    Swelling: absent  Bruising: absent  Scars: absent  Deformity: absent  Scapular Winging: absent  Scapular Dyskinesia: negative  Atrophy: absent    Tenderness   The patient is experiencing no tenderness.    Range of Motion   Active abduction: 90   Passive abduction: 100   Extension: 0   Forward Flexion: 170   Forward Elevation: 170Adduction: 40   External Rotation 0 degrees: 50   Internal rotation 0 degrees: L1     Tests & Signs   Drop arm: negative  Renteria test: negative  Impingement: negative  Lift Off Sign: negative  Active Compression Test (Pickaway's Sign): negative  Speed's Test: negative    Other   Sensation: normal    Left Shoulder Exam     Inspection/Observation   Swelling: absent  Bruising: absent  Scars: present  Deformity: absent  Scapular Winging: absent  Scapular Dyskinesia: negative  Atrophy: absent    Tenderness   The patient is experiencing no tenderness.     Range of Motion   Active abduction: 90   Passive abduction: 100   Extension: 0   Forward Flexion: 170   Forward Elevation: 170Adduction: 40   External Rotation 0 degrees: 50   Internal rotation 0 degrees: L1     Tests & Signs   Drop arm: negative  Renteria test: negative  Impingement: negative  Lift Off Sign: negative  Active Compression test (Pickaway's Sign): negative  Speed's Test: negative  Bear Hug: negative    Other   Sensation: normal     Comments:          Muscle Strength   Right Upper Extremity   Shoulder Abduction: 5/5   Shoulder Internal Rotation: 5/5   Shoulder External Rotation: 5/5   Supraspinatus: 5/5/5   Subscapularis: 5/5/5   Biceps: 5/5/5   Left Upper Extremity  Shoulder Abduction: 5/5   Shoulder Internal Rotation: 5/5   Shoulder External Rotation: 5/5   Supraspinatus: 5/5/5   Subscapularis: 5/5/5   Biceps: 5/5/5     Reflexes     Left Side  Biceps:  2+  Triceps:  2+    Right Side   Biceps:  2+  Triceps:  2+    Vascular Exam     Right Pulses      Radial:                    2+      Left Pulses      Radial:                     2+      Capillary Refill  Right Hand: normal capillary refill  Left Hand: normal capillary refill    Salas Shaw MD 9/5/2018       Narrative     EXAMINATION:  MRI SHOULDER WITHOUT CONTRAST LEFT    CLINICAL HISTORY:  Arthritis, shoulder;Shoulder pain, prior xray, bursitis / tenosynovitis suspected;  Primary osteoarthritis, left shoulder    TECHNIQUE:  Multiplanar/multisequence MRI of the left shoulder was performed without the use of intravenous or intra-articular gadolinium.    COMPARISON:  None    FINDINGS:  Rotator cuff: There is moderate tendinosis with a full-thickness tear involving the near full width of the supraspinatus tendon just proximal to the insertion with approximately 9 mm of retraction.  Tear likely involves a few anterior fibers of the infraspinatus tendon.  There is mild-to-moderate tendinosis of the subscapularis without definite tear.  Teres minor tendon is intact.  Rotator cuff muscle bulk is preserved.    Labrum: Global degenerative changes.    Long head of the biceps: There is suggestion of a possible longitudinal split thickness tear of the extra-articular portion of the long head of the biceps tendon without evidence of retraction.  Intra-articular portion appears intact.    Bones: Marrow signal is within normal limits with the exception of mild cystic change underlying the rotator cuff footprint.  No evidence of fracture or marrow replacement process.    AC joint: Lateral downsloping of the acromion noted.    Cartilage: No large glenohumeral articular cartilage defect demonstrated on this non arthrographic study.  There is suggestion of possible mild degenerative marginal spurring along the inferior aspect of the glenohumeral joint.    Miscellaneous: There is a trace glenohumeral joint effusion.  Mild fluid distention of the subacromial/subdeltoid bursa may be related to underlying rotator cuff pathology and/or bursitis.      Impression       1. Large full-thickness tear of the  supraspinatus as above.  2. Mild to moderate tendinosis of the subscapularis tendon without definite tear.  3. Possible split thickness tear of the extra-articular portion of the long head of the biceps tendon without evidence of retraction.  4. Findings suggesting subacromial/subdeltoid bursitis.  5. Lateral downsloping of the acromion noted      Electronically signed by: Salas Shaw MD  Date: 09/05/2018  Time: 16:44            Assessment:     Encounter Diagnosis   Name Primary?    Complete tear of left rotator cuff Yes        Plan:     Doing great, no issues at this time.  Continue home exercise program  Follow-up p.r.n.

## 2019-05-16 ENCOUNTER — TELEPHONE (OUTPATIENT)
Dept: PULMONOLOGY | Facility: CLINIC | Age: 79
End: 2019-05-16

## 2019-05-17 ENCOUNTER — TELEPHONE (OUTPATIENT)
Dept: PULMONOLOGY | Facility: CLINIC | Age: 79
End: 2019-05-17

## 2020-05-08 ENCOUNTER — TELEPHONE (OUTPATIENT)
Dept: PULMONOLOGY | Facility: CLINIC | Age: 80
End: 2020-05-08

## 2020-05-08 NOTE — TELEPHONE ENCOUNTER
Patient informed me she would call back at later date to schedule all pulm testing and clinic appointment at the [facility] as she does not wish to do any appointments at [facility].

## 2021-08-09 ENCOUNTER — TELEPHONE (OUTPATIENT)
Dept: PULMONOLOGY | Facility: CLINIC | Age: 81
End: 2021-08-09

## 2021-08-09 DIAGNOSIS — J41.0 SIMPLE CHRONIC BRONCHITIS: Primary | ICD-10-CM

## 2021-08-11 ENCOUNTER — HOSPITAL ENCOUNTER (OUTPATIENT)
Dept: RADIOLOGY | Facility: HOSPITAL | Age: 81
Discharge: HOME | End: 2021-08-11
Attending: INTERNAL MEDICINE
Payer: MEDICARE

## 2021-08-11 ENCOUNTER — OFFICE VISIT (OUTPATIENT)
Dept: PULMONOLOGY | Facility: CLINIC | Age: 81
End: 2021-08-11
Payer: MEDICARE

## 2021-08-11 VITALS
HEIGHT: 62 IN | WEIGHT: 133.81 LBS | RESPIRATION RATE: 18 BRPM | OXYGEN SATURATION: 97 % | HEART RATE: 88 BPM | BODY MASS INDEX: 24.63 KG/M2 | SYSTOLIC BLOOD PRESSURE: 152 MMHG | DIASTOLIC BLOOD PRESSURE: 60 MMHG

## 2021-08-11 DIAGNOSIS — J47.1 BRONCHIECTASIS WITH ACUTE EXACERBATION: ICD-10-CM

## 2021-08-11 DIAGNOSIS — A31.0 MAIC (MYCOBACTERIUM AVIUM-INTRACELLULARE COMPLEX): ICD-10-CM

## 2021-08-11 DIAGNOSIS — J41.0 SIMPLE CHRONIC BRONCHITIS: Primary | ICD-10-CM

## 2021-08-11 DIAGNOSIS — J44.1 COPD EXACERBATION: ICD-10-CM

## 2021-08-11 DIAGNOSIS — I10 ESSENTIAL HYPERTENSION: ICD-10-CM

## 2021-08-11 DIAGNOSIS — J41.0 SIMPLE CHRONIC BRONCHITIS: ICD-10-CM

## 2021-08-11 PROCEDURE — 99999 PR PBB SHADOW E&M-EST. PATIENT-LVL IV: CPT | Mod: PBBFAC,,, | Performed by: INTERNAL MEDICINE

## 2021-08-11 PROCEDURE — 99214 OFFICE O/P EST MOD 30 MIN: CPT | Mod: PBBFAC | Performed by: INTERNAL MEDICINE

## 2021-08-11 PROCEDURE — 71046 XR CHEST PA AND LATERAL: ICD-10-PCS | Mod: 26,,, | Performed by: RADIOLOGY

## 2021-08-11 PROCEDURE — 99999 PR PBB SHADOW E&M-EST. PATIENT-LVL IV: ICD-10-PCS | Mod: PBBFAC,,, | Performed by: INTERNAL MEDICINE

## 2021-08-11 PROCEDURE — 99214 OFFICE O/P EST MOD 30 MIN: CPT | Mod: S$PBB,,, | Performed by: INTERNAL MEDICINE

## 2021-08-11 PROCEDURE — 99214 PR OFFICE/OUTPT VISIT, EST, LEVL IV, 30-39 MIN: ICD-10-PCS | Mod: S$PBB,,, | Performed by: INTERNAL MEDICINE

## 2021-08-11 PROCEDURE — 71046 X-RAY EXAM CHEST 2 VIEWS: CPT | Mod: TC

## 2021-08-11 PROCEDURE — 71046 X-RAY EXAM CHEST 2 VIEWS: CPT | Mod: 26,,, | Performed by: RADIOLOGY

## 2021-08-11 RX ORDER — ALBUTEROL SULFATE 0.83 MG/ML
2.5 SOLUTION RESPIRATORY (INHALATION)
Qty: 360 ML | Refills: 11 | Status: SHIPPED | OUTPATIENT
Start: 2021-08-11 | End: 2022-08-11

## 2021-08-11 RX ORDER — CETIRIZINE HYDROCHLORIDE, PSEUDOEPHEDRINE HYDROCHLORIDE 5; 120 MG/1; MG/1
TABLET, FILM COATED, EXTENDED RELEASE ORAL
COMMUNITY

## 2021-08-11 RX ORDER — AZITHROMYCIN 250 MG/1
TABLET, FILM COATED ORAL
Qty: 6 TABLET | Refills: 0 | Status: SHIPPED | OUTPATIENT
Start: 2021-08-11 | End: 2022-03-30

## 2021-08-11 RX ORDER — VIT C/E/ZN/COPPR/LUTEIN/ZEAXAN 250MG-90MG
1000 CAPSULE ORAL
COMMUNITY
Start: 2021-04-19

## 2021-08-11 RX ORDER — OLMESARTAN MEDOXOMIL 40 MG/1
40 TABLET ORAL DAILY
COMMUNITY
Start: 2021-07-13

## 2021-08-11 RX ORDER — PREDNISONE 20 MG/1
TABLET ORAL
Qty: 20 TABLET | Refills: 0 | Status: SHIPPED | OUTPATIENT
Start: 2021-08-11 | End: 2022-03-30

## 2022-02-08 ENCOUNTER — TELEPHONE (OUTPATIENT)
Dept: PULMONOLOGY | Facility: CLINIC | Age: 82
End: 2022-02-08
Payer: COMMERCIAL

## 2022-02-08 NOTE — TELEPHONE ENCOUNTER
Called patient with instructions to arrive 30 minutes early to scheduled appointment for a required covid test. Unable to reach patient or leave a voice message.

## 2022-02-11 ENCOUNTER — TELEPHONE (OUTPATIENT)
Dept: PULMONOLOGY | Facility: CLINIC | Age: 82
End: 2022-02-11
Payer: COMMERCIAL

## 2025-05-23 NOTE — TELEPHONE ENCOUNTER
Pt called to determine pharmacy she would like ipratropium neb solution sent to. Pt stated [facility].  
14
5
7

## (undated) DEVICE — SHAVER BARREL BUR 12-FLUTE 4.0

## (undated) DEVICE — TIP SUCTION YANKAUER

## (undated) DEVICE — ELECTRODE COOLPULSE 90 W/HAND

## (undated) DEVICE — SYR 30CC LUER LOCK

## (undated) DEVICE — SEE MEDLINE ITEM 152530

## (undated) DEVICE — SLEEVE SHOULDER TRACTN/ROTATN

## (undated) DEVICE — Device

## (undated) DEVICE — CANNULA MTK THRD CLR 8.5X75MM

## (undated) DEVICE — CLOSURE SKIN STERI STRIP 1/2X4

## (undated) DEVICE — GAUZE SPONGE 4X4 12PLY

## (undated) DEVICE — MANIFOLD 4 PORT

## (undated) DEVICE — SHAVER TOMCAT 4.0

## (undated) DEVICE — NDL SUREFIRE SCORPION RC

## (undated) DEVICE — APPLICATOR CHLORAPREP ORN 26ML

## (undated) DEVICE — GLOVE 7.5 PROTEXIS PI BLUE

## (undated) DEVICE — MAT SURGICAL ECOSUCTIONER

## (undated) DEVICE — ADHESIVE MASTISOL VIAL 48/BX

## (undated) DEVICE — SEE MEDLINE ITEM 157166

## (undated) DEVICE — SEE MEDLINE ITEM 157131

## (undated) DEVICE — TUBING FLOSTEADY ARTHROSCOPY

## (undated) DEVICE — PAD ABD 8X10 STERILE

## (undated) DEVICE — TUBING CROSSFLOW INFLOW CASS

## (undated) DEVICE — KIT TRIMANO

## (undated) DEVICE — KIT TRIMANO CHIN

## (undated) DEVICE — SKIN MARKER DEVON 160

## (undated) DEVICE — GRASPER SUTURE 60 DEG 15CM PNK

## (undated) DEVICE — GLOVE 7.5 PROTEXIS PI ORTHO PF

## (undated) DEVICE — DRAPE STERI INSTRUMENT 1018

## (undated) DEVICE — SEE MEDLINE ITEM 157027

## (undated) DEVICE — SOL IRR NACL .9% 3000ML

## (undated) DEVICE — SEE MEDLINE ITEM 152739

## (undated) DEVICE — SEE MEDLINE ITEM 157216

## (undated) DEVICE — DRAPE STERI U-SHAPED 47X51IN

## (undated) DEVICE — DRAPE PLASTIC U 60X72

## (undated) DEVICE — MAT ROLL

## (undated) DEVICE — CANNULA TWIST IN 7MM X 7CM

## (undated) DEVICE — GLOVE 8 PROTEXIS PI BLUE

## (undated) DEVICE — CORD BIPOLAR ELECTROSURGICAL

## (undated) DEVICE — SUT PROLENE 1 CTX 30IN BLUE

## (undated) DEVICE — PACK FLUID CONTROL SHOULDER

## (undated) DEVICE — TUBING SET EXTENSIONS IV 20 SL

## (undated) DEVICE — SHAVER RESECTOR 4.0

## (undated) DEVICE — SEE MEDLINE ITEM 157117

## (undated) DEVICE — DRAPE INCISE IOBAN 2 23X33IN

## (undated) DEVICE — SLING ARM ULTRA III PAD LG

## (undated) DEVICE — DRESSING XEROFORM FOIL PK 1X8

## (undated) DEVICE — TAPE SURGICAL MICROFOAM 4IN

## (undated) DEVICE — SEE MEDLINE ITEM 152622

## (undated) DEVICE — CUBE COLD THERAPY POLAR CARE

## (undated) DEVICE — GLOVE PROTEXIS HYDROGEL SZ7.5

## (undated) DEVICE — CANNULA MTK THRD CLR 7X75MM

## (undated) DEVICE — SUT 0 30IN PROLENE BL MONO